# Patient Record
Sex: MALE | Race: ASIAN | NOT HISPANIC OR LATINO | Employment: UNEMPLOYED | URBAN - METROPOLITAN AREA
[De-identification: names, ages, dates, MRNs, and addresses within clinical notes are randomized per-mention and may not be internally consistent; named-entity substitution may affect disease eponyms.]

---

## 2024-01-31 ENCOUNTER — TELEPHONE (OUTPATIENT)
Dept: FAMILY MEDICINE | Facility: CLINIC | Age: 29
End: 2024-01-31

## 2024-02-01 ENCOUNTER — OFFICE VISIT (OUTPATIENT)
Dept: FAMILY MEDICINE | Facility: CLINIC | Age: 29
End: 2024-02-01
Payer: COMMERCIAL

## 2024-02-01 ENCOUNTER — PATIENT MESSAGE (OUTPATIENT)
Dept: FAMILY MEDICINE | Facility: CLINIC | Age: 29
End: 2024-02-01

## 2024-02-01 VITALS
OXYGEN SATURATION: 99 % | HEART RATE: 80 BPM | SYSTOLIC BLOOD PRESSURE: 142 MMHG | WEIGHT: 123 LBS | TEMPERATURE: 98 F | DIASTOLIC BLOOD PRESSURE: 98 MMHG

## 2024-02-01 DIAGNOSIS — F41.0 PANIC DISORDER: ICD-10-CM

## 2024-02-01 DIAGNOSIS — F90.2 ATTENTION DEFICIT HYPERACTIVITY DISORDER (ADHD), COMBINED TYPE: Primary | ICD-10-CM

## 2024-02-01 DIAGNOSIS — K58.2 IRRITABLE BOWEL SYNDROME WITH BOTH CONSTIPATION AND DIARRHEA: ICD-10-CM

## 2024-02-01 DIAGNOSIS — F90.2 ATTENTION DEFICIT HYPERACTIVITY DISORDER (ADHD), COMBINED TYPE: ICD-10-CM

## 2024-02-01 DIAGNOSIS — Z00.00 ANNUAL PHYSICAL EXAM: ICD-10-CM

## 2024-02-01 PROBLEM — K90.9 DIARRHEA DUE TO MALABSORPTION: Status: ACTIVE | Noted: 2024-02-01

## 2024-02-01 PROBLEM — R19.7 DIARRHEA DUE TO MALABSORPTION: Status: ACTIVE | Noted: 2024-02-01

## 2024-02-01 PROCEDURE — 99385 PREV VISIT NEW AGE 18-39: CPT | Mod: S$GLB,,, | Performed by: INTERNAL MEDICINE

## 2024-02-01 PROCEDURE — 3075F SYST BP GE 130 - 139MM HG: CPT | Mod: CPTII,S$GLB,, | Performed by: INTERNAL MEDICINE

## 2024-02-01 PROCEDURE — 99999 PR PBB SHADOW E&M-EST. PATIENT-LVL IV: CPT | Mod: PBBFAC,,, | Performed by: INTERNAL MEDICINE

## 2024-02-01 PROCEDURE — 1160F RVW MEDS BY RX/DR IN RCRD: CPT | Mod: CPTII,S$GLB,, | Performed by: INTERNAL MEDICINE

## 2024-02-01 PROCEDURE — 1159F MED LIST DOCD IN RCRD: CPT | Mod: CPTII,S$GLB,, | Performed by: INTERNAL MEDICINE

## 2024-02-01 PROCEDURE — 3080F DIAST BP >= 90 MM HG: CPT | Mod: CPTII,S$GLB,, | Performed by: INTERNAL MEDICINE

## 2024-02-01 RX ORDER — PROPRANOLOL HYDROCHLORIDE 10 MG/1
10 TABLET ORAL 3 TIMES DAILY
COMMUNITY
End: 2024-05-01

## 2024-02-01 RX ORDER — DEXTROAMPHETAMINE SACCHARATE, AMPHETAMINE ASPARTATE MONOHYDRATE, DEXTROAMPHETAMINE SULFATE AND AMPHETAMINE SULFATE 3.75; 3.75; 3.75; 3.75 MG/1; MG/1; MG/1; MG/1
15 CAPSULE, EXTENDED RELEASE ORAL EVERY MORNING
COMMUNITY
End: 2024-02-01 | Stop reason: SDUPTHER

## 2024-02-01 RX ORDER — DEXTROAMPHETAMINE SACCHARATE, AMPHETAMINE ASPARTATE MONOHYDRATE, DEXTROAMPHETAMINE SULFATE AND AMPHETAMINE SULFATE 3.75; 3.75; 3.75; 3.75 MG/1; MG/1; MG/1; MG/1
15 CAPSULE, EXTENDED RELEASE ORAL EVERY MORNING
Qty: 90 CAPSULE | Refills: 0 | Status: CANCELLED | OUTPATIENT
Start: 2024-02-01

## 2024-02-01 RX ORDER — DEXTROAMPHETAMINE SACCHARATE, AMPHETAMINE ASPARTATE MONOHYDRATE, DEXTROAMPHETAMINE SULFATE AND AMPHETAMINE SULFATE 3.75; 3.75; 3.75; 3.75 MG/1; MG/1; MG/1; MG/1
15 CAPSULE, EXTENDED RELEASE ORAL EVERY MORNING
Qty: 90 CAPSULE | Refills: 0 | Status: SHIPPED | OUTPATIENT
Start: 2024-02-01 | End: 2024-02-02 | Stop reason: SDUPTHER

## 2024-02-01 NOTE — PROGRESS NOTES
Chief Complaint: Medication Refill      Joseph Torrez  is a 28 y.o. year old patient who presents to clinic today to establish as a new patient.     Last year diagnosed with ADHD in [place]. Started on Adderrall.   Juan Carlos Wolf (psychiatrist that diagnosis) [email or web address].    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 Was jaundiced. Work up was normal.     Social hx: Patient does not smoke cigarettes or vape. Patient does not drink alcoholic beverages. 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     Past Surgical History:   Procedure Laterality Date    COLONOSCOPY          Family History   Problem Relation Age of Onset    Cancer Mother     Stroke Paternal Grandfather     Learning disabilities Brother         Social History     Socioeconomic History    Marital status: Single   Tobacco Use    Smoking status: Never     Passive exposure: Never    Smokeless tobacco: Never   Substance and Sexual Activity    Alcohol use: Not Currently    Sexual activity: Not Currently     Partners: Female     Birth control/protection: Condom     Social Determinants of Health     Financial Resource Strain: High Risk (1/31/2024)    Overall Financial Resource Strain (CARDIA)     Difficulty of Paying Living Expenses: Hard   Food Insecurity: Patient Declined (1/31/2024)    Hunger Vital Sign     Worried About Running Out of Food in the Last Year: Patient declined     Ran Out of Food in the Last Year: Patient declined   Transportation Needs: No Transportation Needs (1/31/2024)    PRAPARE - Transportation     Lack of Transportation (Medical): No     Lack of Transportation (Non-Medical): No   Physical Activity: Insufficiently Active (1/31/2024)    Exercise Vital Sign     Days of Exercise per Week: 3 days     Minutes of Exercise per Session: 40 min   Stress: No Stress Concern Present (1/31/2024)    [place] [employer] - Occupational Stress Questionnaire     Feeling of Stress : Only a little   Social  Connections: Unknown (1/31/2024)    Social Connection and Isolation Panel [NHANES]     Frequency of Communication with Friends and Family: More than three times a week     Frequency of Social Gatherings with Friends and Family: Once a week     Active Member of Clubs or Organizations: Yes     Attends Club or Organization Meetings: Patient declined     Marital Status: Never    Housing Stability: Patient Declined (1/31/2024)    Housing Stability Vital Sign     Unable to Pay for Housing in the Last Year: Patient declined     Unstable Housing in the Last Year: Patient declined         Current Outpatient Medications:     propranoloL (INDERAL) 10 MG tablet, Take 10 mg by mouth 3 (three) times daily., Disp: , Rfl:     dextroamphetamine-amphetamine (ADDERALL XR) 15 MG 24 hr capsule, Take 1 capsule (15 mg total) by mouth every morning., Disp: 90 capsule, Rfl: 0     Review of Systems   Constitutional:  Negative for malaise/fatigue.   HENT:  Negative for congestion and sore throat.    Eyes:  Negative for blurred vision.   Respiratory:  Negative for cough and shortness of breath.    Cardiovascular:  Negative for chest pain and leg swelling.   Gastrointestinal:  Positive for abdominal pain, constipation and diarrhea. Negative for nausea.   Genitourinary:  Negative for dysuria.   Musculoskeletal:  Negative for joint pain.   Neurological:  Negative for headaches.   Psychiatric/Behavioral:  Negative for depression. The patient is not nervous/anxious.         Objective:      Vitals:    02/01/24 0803   BP: (!) 142/98   Pulse:    Temp:        Physical Exam  Vitals and nursing note reviewed.   Constitutional:       Appearance: Normal appearance.   HENT:      Head: Normocephalic and atraumatic.   Cardiovascular:      Rate and Rhythm: Normal rate and regular rhythm.   Pulmonary:      Effort: Pulmonary effort is normal.      Breath sounds: Normal breath sounds. No wheezing or rales.   Abdominal:      General: Bowel sounds are  normal.      Palpations: Abdomen is soft.      Tenderness: There is no abdominal tenderness.   Musculoskeletal:      Right lower leg: No edema.      Left lower leg: No edema.   Skin:     General: Skin is warm and dry.   Neurological:      General: No focal deficit present.      Mental Status: He is alert and oriented to person, place, and time.   Psychiatric:         Mood and Affect: Mood normal.         Behavior: Behavior normal.          Assessment:       1. Attention deficit hyperactivity disorder (ADHD), combined type    2. Annual physical exam    3. Irritable bowel syndrome with both constipation and diarrhea    4. Panic disorder          Plan:   1. Attention deficit hyperactivity disorder (ADHD), combined type  Assessment & Plan:  Last year diagnosed with ADHD in [place] by Dr. Juan Carlos Wolf. Started on Adderall with tapering up dose     - continue Adderall  15mg daily (3 month supply given)   - 3 month follow up     Orders:  -     dextroamphetamine-amphetamine (ADDERALL XR) 15 MG 24 hr capsule; Take 1 capsule (15 mg total) by mouth every morning.  Dispense: 90 capsule; Refill: 0    2. Annual physical exam  Assessment & Plan:  - annual labs   - will discuss vaccines at next visit   - Reviewed medical, surgical, family and social history.       Orders:  -     CBC Auto Differential; Future; Expected date: 02/01/2024  -     Hemoglobin A1C; Future; Expected date: 02/01/2024  -     Lipid Panel; Future; Expected date: 02/01/2024  -     Basic Metabolic Panel; Future; Expected date: 02/01/2024  -     HEPATIC FUNCTION PANEL; Future; Expected date: 02/01/2024  -     HIV 1/2 Ag/Ab (4th Gen); Future; Expected date: 02/01/2024  -     Hepatitis C Antibody; Future; Expected date: 02/01/2024    3. Irritable bowel syndrome with both constipation and diarrhea  Assessment & Plan:  Diarrhea: Stool described has yellow and stringy sometimes  Had extensive work up done in [place], including fecal fat which were normal    Reports incontinence sometimes, wilma when he's stressed   Also reports periods of constipation. Says that he has abd pain but it is less severe than the start     - advised patient to continue Immodium PRN for now  - may consider GI referral for IBS treatment if still unresolved   - LFT and lipid panel, if bilirubin high, might consider cholestyramine       4. Panic disorder  Assessment & Plan:  Chronic, controlled. 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

## 2024-02-01 NOTE — ASSESSMENT & PLAN NOTE
- annual labs   - will discuss vaccines at next visit   - Reviewed medical, surgical, family and social history.

## 2024-02-01 NOTE — ASSESSMENT & PLAN NOTE
Last year diagnosed with ADHD in [place] by Dr. Juan Carlos Wolf. Started on Adderall with tapering up dose     - continue Adderall  15mg daily (3 month supply given)   - 3 month follow up

## 2024-02-01 NOTE — ASSESSMENT & PLAN NOTE
Chronic, controlled. 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

## 2024-02-01 NOTE — ASSESSMENT & PLAN NOTE
Diarrhea: Stool described has yellow and stringy sometimes  Had extensive work up done in [place], including fecal fat which were normal   Reports incontinence sometimes, wilma when he's stressed   Also reports periods of constipation. Says that he has abd pain but it is less severe than the start     - advised patient to continue Immodium PRN for now  - may consider GI referral for IBS treatment if still unresolved   - LFT and lipid panel, if bilirubin high, might consider cholestyramine

## 2024-02-01 NOTE — PROGRESS NOTES
Health Maintenance Due   Topic     Hepatitis C Screening  Consult pcp    Lipid Panel  Consult pcp    HPV Vaccines (1 - Male 2-dose series)     HIV Screening  Consult pcp    TETANUS VACCINE      COVID-19 Vaccine (4 - 2023-24 season) Not offered at this office

## 2024-02-02 ENCOUNTER — PATIENT MESSAGE (OUTPATIENT)
Dept: FAMILY MEDICINE | Facility: CLINIC | Age: 29
End: 2024-02-02
Payer: MEDICAID

## 2024-02-02 DIAGNOSIS — F90.2 ATTENTION DEFICIT HYPERACTIVITY DISORDER (ADHD), COMBINED TYPE: ICD-10-CM

## 2024-02-05 RX ORDER — DEXTROAMPHETAMINE SACCHARATE, AMPHETAMINE ASPARTATE MONOHYDRATE, DEXTROAMPHETAMINE SULFATE AND AMPHETAMINE SULFATE 3.75; 3.75; 3.75; 3.75 MG/1; MG/1; MG/1; MG/1
15 CAPSULE, EXTENDED RELEASE ORAL EVERY MORNING
Qty: 90 CAPSULE | Refills: 0 | Status: SHIPPED | OUTPATIENT
Start: 2024-02-05 | End: 2024-05-01 | Stop reason: SDUPTHER

## 2024-02-07 ENCOUNTER — PATIENT MESSAGE (OUTPATIENT)
Dept: FAMILY MEDICINE | Facility: CLINIC | Age: 29
End: 2024-02-07
Payer: MEDICAID

## 2024-02-07 DIAGNOSIS — F90.2 ATTENTION DEFICIT HYPERACTIVITY DISORDER (ADHD), COMBINED TYPE: ICD-10-CM

## 2024-02-09 ENCOUNTER — PATIENT MESSAGE (OUTPATIENT)
Dept: FAMILY MEDICINE | Facility: CLINIC | Age: 29
End: 2024-02-09
Payer: MEDICAID

## 2024-02-09 RX ORDER — DEXTROAMPHETAMINE SACCHARATE, AMPHETAMINE ASPARTATE MONOHYDRATE, DEXTROAMPHETAMINE SULFATE AND AMPHETAMINE SULFATE 3.75; 3.75; 3.75; 3.75 MG/1; MG/1; MG/1; MG/1
15 CAPSULE, EXTENDED RELEASE ORAL EVERY MORNING
Qty: 90 CAPSULE | Refills: 0 | OUTPATIENT
Start: 2024-02-09

## 2024-02-15 ENCOUNTER — LAB VISIT (OUTPATIENT)
Dept: LAB | Facility: HOSPITAL | Age: 29
End: 2024-02-15
Attending: INTERNAL MEDICINE
Payer: MEDICAID

## 2024-02-15 DIAGNOSIS — Z00.00 ANNUAL PHYSICAL EXAM: ICD-10-CM

## 2024-02-15 LAB
ALBUMIN SERPL BCP-MCNC: 4.5 G/DL (ref 3.5–5.2)
ALP SERPL-CCNC: 60 U/L (ref 55–135)
ALT SERPL W/O P-5'-P-CCNC: 15 U/L (ref 10–44)
ANION GAP SERPL CALC-SCNC: 8 MMOL/L (ref 8–16)
AST SERPL-CCNC: 18 U/L (ref 10–40)
BASOPHILS # BLD AUTO: 0.03 K/UL (ref 0–0.2)
BASOPHILS NFR BLD: 0.6 % (ref 0–1.9)
BILIRUB DIRECT SERPL-MCNC: 0.4 MG/DL (ref 0.1–0.3)
BILIRUB SERPL-MCNC: 2.6 MG/DL (ref 0.1–1)
BUN SERPL-MCNC: 13 MG/DL (ref 6–20)
CALCIUM SERPL-MCNC: 9.9 MG/DL (ref 8.7–10.5)
CHLORIDE SERPL-SCNC: 107 MMOL/L (ref 95–110)
CHOLEST SERPL-MCNC: 125 MG/DL (ref 120–199)
CHOLEST/HDLC SERPL: 3.3 {RATIO} (ref 2–5)
CO2 SERPL-SCNC: 25 MMOL/L (ref 23–29)
CREAT SERPL-MCNC: 1.1 MG/DL (ref 0.5–1.4)
DIFFERENTIAL METHOD BLD: NORMAL
EOSINOPHIL # BLD AUTO: 0.1 K/UL (ref 0–0.5)
EOSINOPHIL NFR BLD: 1.4 % (ref 0–8)
ERYTHROCYTE [DISTWIDTH] IN BLOOD BY AUTOMATED COUNT: 12.7 % (ref 11.5–14.5)
EST. GFR  (NO RACE VARIABLE): >60 ML/MIN/1.73 M^2
ESTIMATED AVG GLUCOSE: 85 MG/DL (ref 68–131)
GLUCOSE SERPL-MCNC: 90 MG/DL (ref 70–110)
HBA1C MFR BLD: 4.6 % (ref 4–5.6)
HCT VFR BLD AUTO: 47.9 % (ref 40–54)
HCV AB SERPL QL IA: NORMAL
HDLC SERPL-MCNC: 38 MG/DL (ref 40–75)
HDLC SERPL: 30.4 % (ref 20–50)
HGB BLD-MCNC: 16.3 G/DL (ref 14–18)
HIV 1+2 AB+HIV1 P24 AG SERPL QL IA: NORMAL
IMM GRANULOCYTES # BLD AUTO: 0.01 K/UL (ref 0–0.04)
IMM GRANULOCYTES NFR BLD AUTO: 0.2 % (ref 0–0.5)
LDLC SERPL CALC-MCNC: 75.2 MG/DL (ref 63–159)
LYMPHOCYTES # BLD AUTO: 2.1 K/UL (ref 1–4.8)
LYMPHOCYTES NFR BLD: 40.7 % (ref 18–48)
MCH RBC QN AUTO: 29.3 PG (ref 27–31)
MCHC RBC AUTO-ENTMCNC: 34 G/DL (ref 32–36)
MCV RBC AUTO: 86 FL (ref 82–98)
MONOCYTES # BLD AUTO: 0.3 K/UL (ref 0.3–1)
MONOCYTES NFR BLD: 6 % (ref 4–15)
NEUTROPHILS # BLD AUTO: 2.6 K/UL (ref 1.8–7.7)
NEUTROPHILS NFR BLD: 51.1 % (ref 38–73)
NONHDLC SERPL-MCNC: 87 MG/DL
NRBC BLD-RTO: 0 /100 WBC
PLATELET # BLD AUTO: 234 K/UL (ref 150–450)
PMV BLD AUTO: 11.2 FL (ref 9.2–12.9)
POTASSIUM SERPL-SCNC: 4.8 MMOL/L (ref 3.5–5.1)
PROT SERPL-MCNC: 7.4 G/DL (ref 6–8.4)
RBC # BLD AUTO: 5.56 M/UL (ref 4.6–6.2)
SODIUM SERPL-SCNC: 140 MMOL/L (ref 136–145)
TRIGL SERPL-MCNC: 59 MG/DL (ref 30–150)
WBC # BLD AUTO: 5.14 K/UL (ref 3.9–12.7)

## 2024-02-15 PROCEDURE — 80048 BASIC METABOLIC PNL TOTAL CA: CPT | Performed by: INTERNAL MEDICINE

## 2024-02-15 PROCEDURE — 86803 HEPATITIS C AB TEST: CPT | Performed by: INTERNAL MEDICINE

## 2024-02-15 PROCEDURE — 80076 HEPATIC FUNCTION PANEL: CPT | Performed by: INTERNAL MEDICINE

## 2024-02-15 PROCEDURE — 85025 COMPLETE CBC W/AUTO DIFF WBC: CPT | Performed by: INTERNAL MEDICINE

## 2024-02-15 PROCEDURE — 36415 COLL VENOUS BLD VENIPUNCTURE: CPT | Performed by: INTERNAL MEDICINE

## 2024-02-15 PROCEDURE — 87389 HIV-1 AG W/HIV-1&-2 AB AG IA: CPT | Performed by: INTERNAL MEDICINE

## 2024-02-15 PROCEDURE — 80061 LIPID PANEL: CPT | Performed by: INTERNAL MEDICINE

## 2024-02-15 PROCEDURE — 83036 HEMOGLOBIN GLYCOSYLATED A1C: CPT | Performed by: INTERNAL MEDICINE

## 2024-02-22 ENCOUNTER — TELEPHONE (OUTPATIENT)
Dept: FAMILY MEDICINE | Facility: CLINIC | Age: 29
End: 2024-02-22

## 2024-02-22 ENCOUNTER — OFFICE VISIT (OUTPATIENT)
Dept: FAMILY MEDICINE | Facility: CLINIC | Age: 29
End: 2024-02-22
Payer: COMMERCIAL

## 2024-02-22 ENCOUNTER — LAB VISIT (OUTPATIENT)
Dept: LAB | Facility: HOSPITAL | Age: 29
End: 2024-02-22
Attending: INTERNAL MEDICINE
Payer: MEDICAID

## 2024-02-22 VITALS
TEMPERATURE: 98 F | HEART RATE: 94 BPM | HEIGHT: 69 IN | OXYGEN SATURATION: 98 % | DIASTOLIC BLOOD PRESSURE: 90 MMHG | RESPIRATION RATE: 18 BRPM | BODY MASS INDEX: 17.47 KG/M2 | WEIGHT: 117.94 LBS | SYSTOLIC BLOOD PRESSURE: 142 MMHG

## 2024-02-22 DIAGNOSIS — R63.4 WEIGHT LOSS: ICD-10-CM

## 2024-02-22 DIAGNOSIS — R10.13 EPIGASTRIC PAIN: ICD-10-CM

## 2024-02-22 DIAGNOSIS — Z87.2 HISTORY OF PSORIASIS: ICD-10-CM

## 2024-02-22 DIAGNOSIS — K58.2 IRRITABLE BOWEL SYNDROME WITH BOTH CONSTIPATION AND DIARRHEA: ICD-10-CM

## 2024-02-22 DIAGNOSIS — F90.2 ATTENTION DEFICIT HYPERACTIVITY DISORDER (ADHD), COMBINED TYPE: ICD-10-CM

## 2024-02-22 DIAGNOSIS — R10.13 EPIGASTRIC PAIN: Primary | ICD-10-CM

## 2024-02-22 PROBLEM — Z00.00 ANNUAL PHYSICAL EXAM: Status: RESOLVED | Noted: 2024-02-01 | Resolved: 2024-02-22

## 2024-02-22 PROCEDURE — 3077F SYST BP >= 140 MM HG: CPT | Mod: CPTII,S$GLB,, | Performed by: INTERNAL MEDICINE

## 2024-02-22 PROCEDURE — 3080F DIAST BP >= 90 MM HG: CPT | Mod: CPTII,S$GLB,, | Performed by: INTERNAL MEDICINE

## 2024-02-22 PROCEDURE — 3008F BODY MASS INDEX DOCD: CPT | Mod: CPTII,S$GLB,, | Performed by: INTERNAL MEDICINE

## 2024-02-22 PROCEDURE — 99999 PR PBB SHADOW E&M-EST. PATIENT-LVL V: CPT | Mod: PBBFAC,,, | Performed by: INTERNAL MEDICINE

## 2024-02-22 PROCEDURE — 99214 OFFICE O/P EST MOD 30 MIN: CPT | Mod: S$GLB,,, | Performed by: INTERNAL MEDICINE

## 2024-02-22 PROCEDURE — 86677 HELICOBACTER PYLORI ANTIBODY: CPT | Performed by: INTERNAL MEDICINE

## 2024-02-22 PROCEDURE — 1160F RVW MEDS BY RX/DR IN RCRD: CPT | Mod: CPTII,S$GLB,, | Performed by: INTERNAL MEDICINE

## 2024-02-22 PROCEDURE — 36415 COLL VENOUS BLD VENIPUNCTURE: CPT | Performed by: INTERNAL MEDICINE

## 2024-02-22 PROCEDURE — 3044F HG A1C LEVEL LT 7.0%: CPT | Mod: CPTII,S$GLB,, | Performed by: INTERNAL MEDICINE

## 2024-02-22 PROCEDURE — 1159F MED LIST DOCD IN RCRD: CPT | Mod: CPTII,S$GLB,, | Performed by: INTERNAL MEDICINE

## 2024-02-22 RX ORDER — HYDROCORTISONE 25 MG/G
CREAM TOPICAL 2 TIMES DAILY
Qty: 28 G | Refills: 0 | Status: SHIPPED | OUTPATIENT
Start: 2024-02-22

## 2024-02-22 RX ORDER — BUSPIRONE HYDROCHLORIDE 5 MG/1
TABLET ORAL
COMMUNITY
Start: 2023-07-06 | End: 2024-05-01

## 2024-02-22 RX ORDER — DEXTROAMPHETAMINE SACCHARATE, AMPHETAMINE ASPARTATE MONOHYDRATE, DEXTROAMPHETAMINE SULFATE AND AMPHETAMINE SULFATE 2.5; 2.5; 2.5; 2.5 MG/1; MG/1; MG/1; MG/1
CAPSULE, EXTENDED RELEASE ORAL
COMMUNITY
Start: 2023-10-17 | End: 2024-05-01 | Stop reason: DRUGHIGH

## 2024-02-22 NOTE — PROGRESS NOTES
Chief Complaint: Referral (Derm and GI)      Joseph Torrez  is a 28 y.o. year old patient who presents today for the following issues     Pt reports that he has been having epigastric pain, usually worsened with food. Over the past month. Did stray away from usual IBS diet to gain weight, but pain has not resolved despite adjusting diet. Still has IBS sx of constipation and diarrhea. Denies black stools.     Also has history of psoriasis diagnosed when 8 yo. Reports that he might be developing early signs of recurring rash.     Patient also has been unintentionally losing weight. Has lost about 6lbs from his last visit. Does report not eating dinner sometimes cause it makes him nauseous. Usually eats dinner around 6:30pm.     Is doing well on current dose of Adderall    Past Surgical History:   Procedure Laterality Date    COLONOSCOPY          Family History   Problem Relation Age of Onset    Cancer Mother     Stroke Paternal Grandfather     Learning disabilities Brother         Social History     Socioeconomic History    Marital status: Single   Tobacco Use    Smoking status: Never     Passive exposure: Never    Smokeless tobacco: Never   Substance and Sexual Activity    Alcohol use: Not Currently    Sexual activity: Not Currently     Partners: Female     Birth control/protection: Condom     Social Determinants of Health     Financial Resource Strain: High Risk (1/31/2024)    Overall Financial Resource Strain (CARDIA)     Difficulty of Paying Living Expenses: Hard   Food Insecurity: Patient Declined (1/31/2024)    Hunger Vital Sign     Worried About Running Out of Food in the Last Year: Patient declined     Ran Out of Food in the Last Year: Patient declined   Transportation Needs: No Transportation Needs (1/31/2024)    PRAPARE - Transportation     Lack of Transportation (Medical): No     Lack of Transportation (Non-Medical): No   Physical Activity: Insufficiently Active (1/31/2024)    Exercise Vital Sign     Days of  Exercise per Week: 3 days     Minutes of Exercise per Session: 40 min   Stress: No Stress Concern Present (1/31/2024)    [employer] [employer]ational [employer] - Occupational Stress Questionnaire     Feeling of Stress : Only a little   Social Connections: Unknown (1/31/2024)    Social Connection and Isolation Panel [NHANES]     Frequency of Communication with Friends and Family: More than three times a week     Frequency of Social Gatherings with Friends and Family: Once a week     Active Member of Clubs or Organizations: Yes     Attends Club or Organization Meetings: Patient declined     Marital Status: Never    Housing Stability: Patient Declined (1/31/2024)    Housing Stability Vital Sign     Unable to Pay for Housing in the Last Year: Patient declined     Unstable Housing in the Last Year: Patient declined         Current Outpatient Medications:     dextroamphetamine-amphetamine (ADDERALL XR) 15 MG 24 hr capsule, Take 1 capsule (15 mg total) by mouth every morning., Disp: 90 capsule, Rfl: 0    propranoloL (INDERAL) 10 MG tablet, Take 10 mg by mouth 3 (three) times daily., Disp: , Rfl:     busPIRone (BUSPAR) 5 MG Tab, TAKE 1 TABLET TWICE A DAY BY ORAL ROUTE AS NEEDED., Disp: , Rfl:     dextroamphetamine-amphetamine (ADDERALL XR) 10 MG 24 hr capsule, TAKE ONE CAPSULE (10 MG) BY MOUTH EVERY MORNING WITH BREAKFAST AS NEEDED, Disp: , Rfl:     hydrocortisone 2.5 % cream, Apply topically 2 (two) times daily., Disp: 28 g, Rfl: 0     Review of Systems   Constitutional:  Positive for weight loss.   Gastrointestinal:  Positive for abdominal pain, constipation, diarrhea and heartburn. Negative for blood in stool, melena and nausea.   Skin:  Positive for rash. Negative for itching.        Objective:      Vitals:    02/22/24 0725   BP: (!) 142/90   Pulse:    Resp:    Temp:        Physical Exam  Constitutional:       Appearance: Normal appearance.   HENT:      Head: Normocephalic and atraumatic.   Cardiovascular:       Rate and Rhythm: Normal rate.   Musculoskeletal:         General: Normal range of motion.   Skin:     General: Skin is warm and dry.      Findings: Rash (right upper arm, 2x2cm, flat, slightly scaly) present.   Neurological:      General: No focal deficit present.      Mental Status: He is alert and oriented to person, place, and time.          Assessment:       1. Epigastric pain    2. Irritable bowel syndrome with both constipation and diarrhea    3. Weight loss    4. History of psoriasis    5. Attention deficit hyperactivity disorder (ADHD), combined type          Plan:   1. Epigastric pain  Assessment & Plan:  Acute, mild, intermittent, unresolved. Refer to HPI   Has been taking esmoprazole     - H.pylori testing   - continue PPI for now     Orders:  -     H. PYLORI ANTIBODY, IGG; Future; Expected date: 02/22/2024    2. Irritable bowel syndrome with both constipation and diarrhea  Assessment & Plan:  Patient had extensive work up for IBS, but has not had EGD.     - patient plans to find psychotherapist for IBS   - symptomatic mgmt with dietary changes for now     Orders:  -     Ambulatory referral/consult to Nutrition Services; Future; Expected date: 02/29/2024  -     Ambulatory referral/consult to Gastroenterology; Future; Expected date: 02/29/2024    3. Weight loss  Assessment & Plan:  Repots unintentional weight loss, refer to [facility]     - referral to dietician    Orders:  -     Ambulatory referral/consult to Nutrition Services; Future; Expected date: 02/29/2024    4. History of psoriasis  Assessment & Plan:  Also has history of psoriasis diagnosed when 10 yo. Reports that he might be developing early signs of recurring rash.     - derm referral   - hydrocort PRN     Orders:  -     Ambulatory referral/consult to Dermatology; Future; Expected date: 02/29/2024  -     hydrocortisone 2.5 % cream; Apply topically 2 (two) times daily.  Dispense: 28 g; Refill: 0    5. Attention deficit hyperactivity disorder (ADHD),  combined type  Assessment & Plan:  Chronic, controlled     - continue adderall          Other orders  -     Cancel: (In Office Administered) HPV Vaccine (9-Valent) (3 Dose) (IM)         No follow-ups on file.

## 2024-02-22 NOTE — ASSESSMENT & PLAN NOTE
Also has history of psoriasis diagnosed when 10 yo. Reports that he might be developing early signs of recurring rash.     - derm referral   - hydrocort PRN

## 2024-02-22 NOTE — ASSESSMENT & PLAN NOTE
Acute, mild, intermittent, unresolved. Refer to HPI   Has been taking esmoprazole     - H.pylori testing   - continue PPI for now

## 2024-02-22 NOTE — ASSESSMENT & PLAN NOTE
Patient had extensive work up for IBS, but has not had EGD.     - patient plans to find psychotherapist for IBS   - symptomatic mgmt with dietary changes for now

## 2024-02-22 NOTE — TELEPHONE ENCOUNTER
Olive/Shaista Pre-Authorization needs to know what provider Dr. Maya is referring patient to for Nutrition Referral.  Please advise.

## 2024-02-22 NOTE — PROGRESS NOTES
Health Maintenance Due   Topic     HPV Vaccines (1 - Male 2-dose series)     COVID-19 Vaccine (4 - 2023-24 season) Not offered at this Facility

## 2024-02-22 NOTE — TELEPHONE ENCOUNTER
----- Message from Bertha Watson sent at 2/22/2024  3:42 PM CST -----  .Type: Patient Call Back    Who called: Olive Noonan Pre Authorization     What is the request in detail: Calling about a Nutrition Referral. Need to know what provider you're referring him to    Can the clinic reply by [facility]? No     Would the patient rather a call back or a response via [facility]? Call Back     Best call back number:[phone]    Additional Information:

## 2024-02-23 LAB — H PYLORI IGG SERPL QL IA: NEGATIVE

## 2024-02-27 NOTE — TELEPHONE ENCOUNTER
Per Olive/Shaista Pre-Authorization the referral was able to be approved with having the name of the provider patient was being referred to.  Please be advised.

## 2024-04-30 ENCOUNTER — TELEPHONE (OUTPATIENT)
Dept: FAMILY MEDICINE | Facility: CLINIC | Age: 29
End: 2024-04-30
Payer: MEDICAID

## 2024-05-01 ENCOUNTER — OFFICE VISIT (OUTPATIENT)
Dept: FAMILY MEDICINE | Facility: CLINIC | Age: 29
End: 2024-05-01
Payer: COMMERCIAL

## 2024-05-01 VITALS
TEMPERATURE: 98 F | WEIGHT: 115.75 LBS | DIASTOLIC BLOOD PRESSURE: 72 MMHG | HEART RATE: 83 BPM | RESPIRATION RATE: 18 BRPM | SYSTOLIC BLOOD PRESSURE: 130 MMHG | BODY MASS INDEX: 17.14 KG/M2 | HEIGHT: 69 IN | OXYGEN SATURATION: 99 %

## 2024-05-01 DIAGNOSIS — K58.2 IRRITABLE BOWEL SYNDROME WITH BOTH CONSTIPATION AND DIARRHEA: ICD-10-CM

## 2024-05-01 DIAGNOSIS — F90.2 ATTENTION DEFICIT HYPERACTIVITY DISORDER (ADHD), COMBINED TYPE: Primary | ICD-10-CM

## 2024-05-01 DIAGNOSIS — Z23 NEED FOR HPV VACCINATION: ICD-10-CM

## 2024-05-01 DIAGNOSIS — R63.4 WEIGHT LOSS: ICD-10-CM

## 2024-05-01 DIAGNOSIS — Z87.2 HISTORY OF PSORIASIS: ICD-10-CM

## 2024-05-01 PROBLEM — F41.0 PANIC DISORDER: Status: RESOLVED | Noted: 2024-02-01 | Resolved: 2024-05-01

## 2024-05-01 PROCEDURE — 90471 IMMUNIZATION ADMIN: CPT | Mod: S$GLB,,, | Performed by: INTERNAL MEDICINE

## 2024-05-01 PROCEDURE — 3008F BODY MASS INDEX DOCD: CPT | Mod: CPTII,S$GLB,, | Performed by: INTERNAL MEDICINE

## 2024-05-01 PROCEDURE — 99999 PR PBB SHADOW E&M-EST. PATIENT-LVL III: CPT | Mod: PBBFAC,,, | Performed by: INTERNAL MEDICINE

## 2024-05-01 PROCEDURE — 1159F MED LIST DOCD IN RCRD: CPT | Mod: CPTII,S$GLB,, | Performed by: INTERNAL MEDICINE

## 2024-05-01 PROCEDURE — 90651 9VHPV VACCINE 2/3 DOSE IM: CPT | Mod: S$GLB,,, | Performed by: INTERNAL MEDICINE

## 2024-05-01 PROCEDURE — 3078F DIAST BP <80 MM HG: CPT | Mod: CPTII,S$GLB,, | Performed by: INTERNAL MEDICINE

## 2024-05-01 PROCEDURE — 3075F SYST BP GE 130 - 139MM HG: CPT | Mod: CPTII,S$GLB,, | Performed by: INTERNAL MEDICINE

## 2024-05-01 PROCEDURE — 1160F RVW MEDS BY RX/DR IN RCRD: CPT | Mod: CPTII,S$GLB,, | Performed by: INTERNAL MEDICINE

## 2024-05-01 PROCEDURE — 99214 OFFICE O/P EST MOD 30 MIN: CPT | Mod: 25,S$GLB,, | Performed by: INTERNAL MEDICINE

## 2024-05-01 PROCEDURE — 3044F HG A1C LEVEL LT 7.0%: CPT | Mod: CPTII,S$GLB,, | Performed by: INTERNAL MEDICINE

## 2024-05-01 RX ORDER — DEXTROAMPHETAMINE SACCHARATE, AMPHETAMINE ASPARTATE MONOHYDRATE, DEXTROAMPHETAMINE SULFATE AND AMPHETAMINE SULFATE 3.75; 3.75; 3.75; 3.75 MG/1; MG/1; MG/1; MG/1
15 CAPSULE, EXTENDED RELEASE ORAL EVERY MORNING
Qty: 90 CAPSULE | Refills: 0 | Status: SHIPPED | OUTPATIENT
Start: 2024-05-01

## 2024-05-01 NOTE — ASSESSMENT & PLAN NOTE
Repots unintentional weight loss, refer to HPI   123 (in Feb) -> 115lbs  Also has been trying to have smaller and more frequent meals. Reports his appetite is slowly picking up.    - encouraged patient to continue current dietary changes and to build a more positive relationship with food  - f/up 3 months, if still losing weight, will consider starting cyproheptadine

## 2024-05-01 NOTE — ASSESSMENT & PLAN NOTE
Did not get complete vaccination.   Will receive 1st dose today. Counseled pt on need for 2nd dose in 2 months and third dose in 6 months. Patient said he will arrange at the main campus.

## 2024-05-01 NOTE — PROGRESS NOTES
Chief Complaint: Follow-up      Joseph Torrez  is a 28 y.o. year old patient who presents today for follow up     Changed scrubs, reports rash is better. Also has been trying to have smaller and more frequent meals. Reports his appetite is slowly picking up. Did not start buspar or propanolol. Doing well on Adderall, issues with appetite started before starting Adderall.     Past Medical History:   Diagnosis Date    Panic disorder 02/01/2024       Past Surgical History:   Procedure Laterality Date    COLONOSCOPY          Family History   Problem Relation Name Age of Onset    Cancer Mother Gaby Clay     Stroke Paternal Grandfather Abi Rivas     Learning disabilities Brother Serge Samayoa         Social History     Socioeconomic History    Marital status: Single   Tobacco Use    Smoking status: Never     Passive exposure: Never    Smokeless tobacco: Never   Substance and Sexual Activity    Alcohol use: Not Currently    Sexual activity: Not Currently     Partners: Female     Birth control/protection: Condom     Social Determinants of Health     Financial Resource Strain: High Risk (1/31/2024)    Overall Financial Resource Strain (CARDIA)     Difficulty of Paying Living Expenses: Hard   Food Insecurity: Patient Declined (1/31/2024)    Hunger Vital Sign     Worried About Running Out of Food in the Last Year: Patient declined     Ran Out of Food in the Last Year: Patient declined   Transportation Needs: No Transportation Needs (1/31/2024)    PRAPARE - Transportation     Lack of Transportation (Medical): No     Lack of Transportation (Non-Medical): No   Physical Activity: Insufficiently Active (1/31/2024)    Exercise Vital Sign     Days of Exercise per Week: 3 days     Minutes of Exercise per Session: 40 min   Stress: No Stress Concern Present (1/31/2024)    [employer] of [employer]ational [employer] - Occupational Stress Questionnaire     Feeling of Stress : Only a little   Housing Stability: Patient Declined  (1/31/2024)    Housing Stability Vital Sign     Unable to Pay for Housing in the Last Year: Patient declined     Unstable Housing in the Last Year: Patient declined         Current Outpatient Medications:     hydrocortisone 2.5 % cream, Apply topically 2 (two) times daily., Disp: 28 g, Rfl: 0    dextroamphetamine-amphetamine (ADDERALL XR) 15 MG 24 hr capsule, Take 1 capsule (15 mg total) by mouth every morning., Disp: 90 capsule, Rfl: 0  No current facility-administered medications for this visit.     Review of Systems   Constitutional:  Positive for weight loss.   Gastrointestinal:  Negative for abdominal pain, constipation and diarrhea.   Psychiatric/Behavioral:  The patient is not nervous/anxious.         Objective:      Vitals:    05/01/24 0712   BP: 130/72   Pulse: 83   Resp: 18   Temp: 97.7 °F (36.5 °C)       Physical Exam  Constitutional:       Comments: Thin body habitus   HENT:      Head: Normocephalic and atraumatic.   Cardiovascular:      Rate and Rhythm: Normal rate.   Musculoskeletal:         General: Normal range of motion.   Skin:     General: Skin is warm and dry.   Neurological:      General: No focal deficit present.      Mental Status: He is alert and oriented to person, place, and time.          Assessment:       1. Attention deficit hyperactivity disorder (ADHD), combined type    2. History of psoriasis    3. Weight loss    4. Irritable bowel syndrome with both constipation and diarrhea    5. Need for HPV vaccination          Plan:   1. Attention deficit hyperactivity disorder (ADHD), combined type  Assessment & Plan:  Chronic, controlled     - continue adderall 15mg daily (3 month supply)      Orders:  -     dextroamphetamine-amphetamine (ADDERALL XR) 15 MG 24 hr capsule; Take 1 capsule (15 mg total) by mouth every morning.  Dispense: 90 capsule; Refill: 0    2. History of psoriasis  Assessment & Plan:  Also has history of psoriasis diagnosed when 8 yo. Changed scrubs, reports rash is  better.    - has derm appt in June  - hydrocort PRN       3. Weight loss  Assessment & Plan:  Repots unintentional weight loss, refer to HPI   123 (in Feb) -> 115lbs  Also has been trying to have smaller and more frequent meals. Reports his appetite is slowly picking up.    - encouraged patient to continue current dietary changes and to build a more positive relationship with food  - f/up 3 months, if still losing weight, will consider starting cyproheptadine       4. Irritable bowel syndrome with both constipation and diarrhea  Assessment & Plan:  Patient had extensive work up for IBS, but has not had EGD.   Symptoms slightly improved with changes in meals     - has appt with GI in June   - symptomatic mgmt with dietary changes for now       5. Need for HPV vaccination  Assessment & Plan:  Did not get complete vaccination.   Will receive 1st dose today. Counseled pt on need for 2nd dose in 2 months and third dose in 6 months. Patient said he will arrange at the main campus.     Orders:  -     VFC-hpv vaccine,9-daniel (GARDASIL 9) vaccine 0.5 mL         Follow up in about 3 months (around 8/1/2024) for med refill.

## 2024-05-01 NOTE — ASSESSMENT & PLAN NOTE
Also has history of psoriasis diagnosed when 10 yo. Changed scrubs, reports rash is better.    - has derm appt in June  - hydrocort PRN

## 2024-05-01 NOTE — PROGRESS NOTES
Health Maintenance Due   Topic     HPV Vaccines (1 - Male 3-dose series) Pt agree to get today    COVID-19 Vaccine (4 - 2023-24 season) Not offered at this Facility

## 2024-05-01 NOTE — ASSESSMENT & PLAN NOTE
Patient had extensive work up for IBS, but has not had EGD.   Symptoms slightly improved with changes in meals     - has appt with GI in June   - symptomatic mgmt with dietary changes for now

## 2024-06-03 ENCOUNTER — OFFICE VISIT (OUTPATIENT)
Dept: DERMATOLOGY | Facility: CLINIC | Age: 29
End: 2024-06-03
Payer: COMMERCIAL

## 2024-06-03 DIAGNOSIS — L30.9 DERMATITIS, UNSPECIFIED: ICD-10-CM

## 2024-06-03 PROCEDURE — 1160F RVW MEDS BY RX/DR IN RCRD: CPT | Mod: CPTII,S$GLB,, | Performed by: STUDENT IN AN ORGANIZED HEALTH CARE EDUCATION/TRAINING PROGRAM

## 2024-06-03 PROCEDURE — 88342 IMHCHEM/IMCYTCHM 1ST ANTB: CPT | Performed by: PATHOLOGY

## 2024-06-03 PROCEDURE — 88305 TISSUE EXAM BY PATHOLOGIST: CPT | Performed by: PATHOLOGY

## 2024-06-03 PROCEDURE — 88312 SPECIAL STAINS GROUP 1: CPT | Performed by: PATHOLOGY

## 2024-06-03 PROCEDURE — 99202 OFFICE O/P NEW SF 15 MIN: CPT | Mod: 25,S$GLB,, | Performed by: STUDENT IN AN ORGANIZED HEALTH CARE EDUCATION/TRAINING PROGRAM

## 2024-06-03 PROCEDURE — 88341 IMHCHEM/IMCYTCHM EA ADD ANTB: CPT | Mod: 59 | Performed by: PATHOLOGY

## 2024-06-03 PROCEDURE — 99999 PR PBB SHADOW E&M-EST. PATIENT-LVL III: CPT | Mod: PBBFAC,,, | Performed by: STUDENT IN AN ORGANIZED HEALTH CARE EDUCATION/TRAINING PROGRAM

## 2024-06-03 PROCEDURE — 1159F MED LIST DOCD IN RCRD: CPT | Mod: CPTII,S$GLB,, | Performed by: STUDENT IN AN ORGANIZED HEALTH CARE EDUCATION/TRAINING PROGRAM

## 2024-06-03 PROCEDURE — 3044F HG A1C LEVEL LT 7.0%: CPT | Mod: CPTII,S$GLB,, | Performed by: STUDENT IN AN ORGANIZED HEALTH CARE EDUCATION/TRAINING PROGRAM

## 2024-06-03 NOTE — PROGRESS NOTES
Subjective:      Patient ID:  Joseph Torrez is a 28 y.o. male who presents for   Chief Complaint   Patient presents with    Psoriasis     28 y.o. 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 Psoriasis/rash  Pt states he was diagnosed with psoriasis in middle school (ears, extensor arms and legs) and has not had a flare up in years. Recently broke out in a scaly rash on shoulders / arms / legs 3 months ago. Was unsure if this was a reaction to something or a psoriasis flare up. He applied steroid cream from PCP and flare up resolved.   No joint pains or stiffness  Denies preceding URI or strep     2. Light discoloration  He has white spots/discoloration on trunk, arms, legs since about age 13 as well and in areas different than prior psoriasis  Not itchy  Start scaly sometimes  Getting new ones still  Pt was born in [place]           Review of Systems   Constitutional:  Negative for fever.   HENT:  Positive for sore throat.    Gastrointestinal:  Negative for diarrhea.   Musculoskeletal:  Negative for arthralgias.   Skin:  Positive for itching and rash.       Objective:   Physical Exam   Skin:   Areas Examined (abnormalities noted in diagram):   RUE Inspected  LUE Inspection Performed  RLE Inspected  LLE Inspection Performed       Diagram Legend     Erythematous scaling macule/papule c/w actinic keratosis       Vascular papule c/w angioma      Pigmented verrucoid papule/plaque c/w seborrheic keratosis      Yellow umbilicated papule c/w sebaceous hyperplasia      Irregularly shaped tan macule c/w lentigo     1-2 mm smooth white papules consistent with Milia      Movable subcutaneous cyst with punctum c/w epidermal inclusion cyst      Subcutaneous movable cyst c/w pilar cyst      Firm pink to brown papule c/w dermatofibroma      Pedunculated fleshy papule(s) c/w skin tag(s)      Evenly pigmented macule c/w junctional nevus     Mildly variegated pigmented, slightly  irregular-bordered macule c/w mildly atypical nevus      Flesh colored to evenly pigmented papule c/w intradermal nevus       Pink pearly papule/plaque c/w basal cell carcinoma      Erythematous hyperkeratotic cursted plaque c/w SCC      Surgical scar with no sign of skin cancer recurrence      Open and closed comedones      Inflammatory papules and pustules      Verrucoid papule consistent consistent with wart     Erythematous eczematous patches and plaques     Dystrophic onycholytic nail with subungual debris c/w onychomycosis     Umbilicated papule    Erythematous-base heme-crusted tan verrucoid plaque consistent with inflamed seborrheic keratosis     Erythematous Silvery Scaling Plaque c/w Psoriasis     See annotation                  Assessment / Plan:      Pathology Orders:       Normal Orders This Visit    Specimen to Pathology, Dermatology     Comments:    Number of Specimens:->1  ------------------------->-------------------------  Spec 1 Procedure:->Biopsy  Spec 1 Clinical Impression:->hypopigmented 2 mm round macules  non-scaly diffusely over arms, trunk, legs- pitryiasis  licenoides v idiopathic guttate hypomelanosis v less likely  hypopigmented MF  Spec 1 Source:->left upper arm  Release to patient->Immediate    Questions:    Procedure Type: Dermatology and skin neoplasms    Number of Specimens: 1    ------------------------: -------------------------    Spec 1 Procedure: Biopsy    Spec 1 Clinical Impression: hypopigmented 2 mm round macules non-scaly diffusely over arms, trunk, legs- pitryiasis licenoides v idiopathic guttate hypomelanosis v less likely hypopigmented MF    Spec 1 Source: left upper arm    Release to patient: Immediate    Release to patient:           Dermatitis, unspecified  Status: chronic condition flaring and/or not at treatment goal (> 1 yr)  Punch biopsy procedure note:  Punch biopsy performed after verbal consent obtained. Area marked and prepped with alcohol. Approximately 1cc  of 1% lidocaine with epinephrine injected. 3 mm disposable punch used to remove lesion. Hemostasis obtained and biopsy site closed with 1 - 2 Prolene sutures. Wound care instructions reviewed with patient and handout given.    Pt initially made appt/presented for hx of psoriasis/rash which has completely resolved on exam today. Recommend he take pictures or present  to clinic if this flares again. Incidentally was found to have hypopigmented macules diffusely on body- he states he is bothered by this, has had this condition approximately since age 13 and still spreading, and would like to pursue diagnosis and treatment so performed biopsy today to help with management    RTC 2 weeks suture removal

## 2024-06-03 NOTE — Clinical Note
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 I was kind of stumped and want to make sure I'm not missing something obvious. Took a biopsy today he says he still gets new ones present since age 13 maybe??

## 2024-06-06 ENCOUNTER — OFFICE VISIT (OUTPATIENT)
Dept: GASTROENTEROLOGY | Facility: CLINIC | Age: 29
End: 2024-06-06
Payer: MEDICAID

## 2024-06-06 ENCOUNTER — PATIENT MESSAGE (OUTPATIENT)
Dept: GASTROENTEROLOGY | Facility: CLINIC | Age: 29
End: 2024-06-06

## 2024-06-06 VITALS
HEIGHT: 68 IN | DIASTOLIC BLOOD PRESSURE: 104 MMHG | BODY MASS INDEX: 16.94 KG/M2 | SYSTOLIC BLOOD PRESSURE: 147 MMHG | WEIGHT: 111.75 LBS | HEART RATE: 101 BPM

## 2024-06-06 DIAGNOSIS — R10.13 EPIGASTRIC PAIN: Primary | ICD-10-CM

## 2024-06-06 DIAGNOSIS — R76.8 POSITIVE ANA (ANTINUCLEAR ANTIBODY): ICD-10-CM

## 2024-06-06 DIAGNOSIS — R10.11 RUQ PAIN: ICD-10-CM

## 2024-06-06 DIAGNOSIS — R13.19 ESOPHAGEAL DYSPHAGIA: ICD-10-CM

## 2024-06-06 DIAGNOSIS — R63.4 WEIGHT LOSS: ICD-10-CM

## 2024-06-06 DIAGNOSIS — K21.9 GASTROESOPHAGEAL REFLUX DISEASE WITHOUT ESOPHAGITIS: ICD-10-CM

## 2024-06-06 PROCEDURE — 99204 OFFICE O/P NEW MOD 45 MIN: CPT | Mod: S$PBB,,,

## 2024-06-06 PROCEDURE — 3077F SYST BP >= 140 MM HG: CPT | Mod: CPTII,,,

## 2024-06-06 PROCEDURE — 99213 OFFICE O/P EST LOW 20 MIN: CPT | Mod: PBBFAC

## 2024-06-06 PROCEDURE — 3008F BODY MASS INDEX DOCD: CPT | Mod: CPTII,,,

## 2024-06-06 PROCEDURE — 3080F DIAST BP >= 90 MM HG: CPT | Mod: CPTII,,,

## 2024-06-06 PROCEDURE — 1160F RVW MEDS BY RX/DR IN RCRD: CPT | Mod: CPTII,,,

## 2024-06-06 PROCEDURE — 1159F MED LIST DOCD IN RCRD: CPT | Mod: CPTII,,,

## 2024-06-06 PROCEDURE — 3044F HG A1C LEVEL LT 7.0%: CPT | Mod: CPTII,,,

## 2024-06-06 PROCEDURE — 99999 PR PBB SHADOW E&M-EST. PATIENT-LVL III: CPT | Mod: PBBFAC,,,

## 2024-06-06 RX ORDER — AMOXICILLIN 500 MG
1 CAPSULE ORAL DAILY
COMMUNITY

## 2024-06-06 RX ORDER — FAMOTIDINE 40 MG/1
40 TABLET, FILM COATED ORAL DAILY
Qty: 30 TABLET | Refills: 11 | Status: SHIPPED | OUTPATIENT
Start: 2024-06-06 | End: 2025-06-06

## 2024-06-06 RX ORDER — DOCUSATE SODIUM 100 MG/1
100 CAPSULE, LIQUID FILLED ORAL DAILY
COMMUNITY

## 2024-06-06 RX ORDER — FERROUS SULFATE 325(65) MG
325 TABLET, DELAYED RELEASE (ENTERIC COATED) ORAL DAILY
COMMUNITY

## 2024-06-06 NOTE — PATIENT INSTRUCTIONS
For GERD/Reflux:     Take Pepcid 20mg daily to help with symptoms, as needed.     Remain upright for at least 3 hours after eating.      Elevate the head of the bed for nighttime.      Avoid foods that you have noticed make your symptoms worse (possible triggers include: peppermint, alcohol, chocolate, caffeine, spicy foods, greasy/fried foods, acidic foods-citrus).

## 2024-06-06 NOTE — PROGRESS NOTES
Gastroenterology Clinic Consultation Note    Reason for Visit:  The primary encounter diagnosis was Epigastric pain. Diagnoses of RUQ pain, Weight loss, Esophageal dysphagia, Gastroesophageal reflux disease without esophagitis, Body mass index (BMI) less than 19 in adult, and Positive CANDIDA (antinuclear antibody) were also pertinent to this visit.    PCP:   Barbara Maya   [address] / [facility] [address]      Initial HPI   This is a 28 y.o. male presenting for complaints of abdominal pain, specifically epigastric and RUQ, gastroesophageal reflux, weight loss concerns and esophageal dysphagia.     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 He reports 2 years ago (2022) he was in [place] for school when he developed excruciating epigastric in which he went to the ED at that time he reports he was jaundice and lab work revealed elevated LFTs and doctors were concerned for pancreatitis. He underwent a MRCP and US at that time which showed a dilated bile duct, but was negative for gallstones. Pt had repeat labs in 11/2022 which were back to normal. No further testing or symptoms proceeded from his recollection. (Unable to obtain records from [place] healthcare visits.)     A year later he moved to [place] and was treated by GI from April to October 2023 for concerns of Crohns vs IBS-D. He underwent a colonoscopy which revealed one small polyp. He does not recall the timeframe in which GI advised repeat colonoscopy. He was also suffering with GERD at that time and completed an 8 week trial of Protonix which was helpful and he was able to DC PPI use. No EGD was advised at that time. He did have blood work, autoimmune testing, and stool testing at that time. His CANDIDA came back positive for CANDIDA 1:40 nuclear speckled, negative for celiac, but bilirubin levels  were elevated. At that time he was told he had Gilbert's Syndrome that could possibly be causing his abdominal discomfort and elevated bilirubin and Gilbert's Syndrome required no treatment. No official diagnosis was ever given for Crohns nor IBS-D. He was started on Adderall for ADHD at that time and noticed he began losing weight after initiation in October 2023 (BMI 18.1 then). (Limited medical records available from [facility]. No diagnostic imaging or lab work available either).     Currently, he has complaints of epigastric/RUQ pain for the past 1.5 months. He reports he is having yellowish colored stools, [place] stool scale 4-5. He reports fatty foods will make the stools looser, BSC 6. He follows a vegetarian diet and supplements his protein with dairy, chickpeas, beans and nuts as well as protein bars and shakes. He takes stool softners, vitamins B, D & K2, omega 3, and iron pills daily. He does not smoke cigarettes, occasionally drinks alcohol, and does not notice any effects of caffeine on abdominal pain. He has begun to notice esophageal dysphagia in the past 1.5 months when eating more solid foods such as a veggie burger or eats foods too quickly. He does not have any troubles with clear or full liquids. He has also noticed that the GERD symptoms he had in the past have begun again. He reports he did not want to start a H2 blocker or PPI before seeing GI. He notices that when he eats more frequent smaller meals the symptoms are not as bad and he recently began probiotics which have helped as well.     He denies fever, chills, nausea, vomiting, regurgitation, changes in bowel habits, melena, blood in stool, and changes in stool caliber. He is mostly concerned with continued weight loss, BMI now 17.0 down from 18.1 in October 2023. Discussed holding off on PPI or H2 until EGD/Colonscopy completed.    ROS:  Review of Systems   Constitutional:  Positive for malaise/fatigue and weight loss.  "Negative for chills and fever.   HENT: Negative.     Eyes: Negative.    Respiratory:  Negative for cough and shortness of breath.    Cardiovascular: Negative.  Negative for chest pain and palpitations.   Gastrointestinal:  Positive for abdominal pain, diarrhea (at times not daily) and heartburn. Negative for blood in stool, constipation, melena, nausea and vomiting.   Genitourinary: Negative.  Negative for flank pain.   Musculoskeletal: Negative.    Skin:  Positive for rash (seeing derm for new rash, h/o psoriasis).   Neurological: Negative.    Endo/Heme/Allergies: Negative.    Psychiatric/Behavioral:  The patient is nervous/anxious.    All other systems reviewed and are negative.       Medical History:  has a past medical history of Panic disorder (02/01/2024).    Surgical History:  has a past surgical history that includes Colonoscopy.    Family History: family history includes Cancer in his mother; Learning disabilities in his brother; Stroke in his paternal grandfather..       Review of patient's allergies indicates:  No Known Allergies    Current Outpatient Medications on File Prior to Visit   Medication Sig Dispense Refill    docusate sodium (COLACE) 100 MG capsule Take 100 mg by mouth once daily.      cholecalciferol, vitD3,/vit K2 (VITAMIN D3-VITAMIN K2 ORAL) Take 1 capsule by mouth once daily.      dextroamphetamine-amphetamine (ADDERALL XR) 15 MG 24 hr capsule Take 1 capsule (15 mg total) by mouth every morning. 90 capsule 0    ferrous sulfate 325 (65 FE) MG EC tablet Take 325 mg by mouth once daily.      hydrocortisone 2.5 % cream Apply topically 2 (two) times daily. 28 g 0    omega-3 fatty acids/fish oil (FISH OIL-OMEGA-3 FATTY ACIDS) 300-1,000 mg capsule Take 1 capsule by mouth once daily.       No current facility-administered medications on file prior to visit.         Objective Findings:    Vital Signs:  BP (!) 147/104   Pulse 101   Ht 5' 8" (1.727 m)   Wt 50.7 kg (111 lb 12.4 oz)   BMI 17.00 " kg/m²   Body mass index is 17 kg/m².    Physical Exam:  Physical Exam  Vitals and nursing note reviewed.   Constitutional:       General: He is not in acute distress.     Appearance: Normal appearance. He is underweight. He is not ill-appearing.   HENT:      Head: Normocephalic and atraumatic.      Right Ear: External ear normal.      Left Ear: External ear normal.      Nose: Nose normal.   Eyes:      General: No scleral icterus.     Extraocular Movements: Extraocular movements intact.   Cardiovascular:      Rate and Rhythm: Tachycardia present.   Pulmonary:      Effort: Pulmonary effort is normal. No respiratory distress.   Abdominal:      General: Abdomen is flat. There is no distension.      Tenderness: There is no guarding.   Musculoskeletal:         General: No swelling. Normal range of motion.      Cervical back: Normal range of motion.   Skin:     General: Skin is warm and dry.      Coloration: Skin is not jaundiced.      Findings: Rash present.   Neurological:      Mental Status: He is alert and oriented to person, place, and time.   Psychiatric:         Speech: Speech is rapid and pressured.         Thought Content: Thought content normal.       Labs:  Lab Results   Component Value Date    WBC 5.14 02/15/2024    HGB 16.3 02/15/2024    HCT 47.9 02/15/2024     02/15/2024    CHOL 125 02/15/2024    TRIG 59 02/15/2024    HDL 38 (L) 02/15/2024    ALKPHOS 60 02/15/2024    ALT 15 02/15/2024    AST 18 02/15/2024     02/15/2024    K 4.8 02/15/2024     02/15/2024    CREATININE 1.1 02/15/2024    BUN 13 02/15/2024    CO2 25 02/15/2024    HGBA1C 4.6 02/15/2024       Imaging reviewed: None available      Endoscopy reviewed: Not available      Assessment:  1. Epigastric pain    2. RUQ pain    3. Weight loss    4. Esophageal dysphagia    5. Gastroesophageal reflux disease without esophagitis    6. Body mass index (BMI) less than 19 in adult    7. Positive CANDIDA (antinuclear antibody)      Orders Placed This  Encounter    Stool culture    US Abdomen Limited (GALLBLADDER)    CBC W/ AUTO DIFFERENTIAL    COMPREHENSIVE METABOLIC PANEL    H. pylori antigen, stool    Pancreatic elastase, fecal    Fecal fat, qualitative    WBC, Stool    Rotavirus antigen, stool    Adenovirus Molecular Detection, PCR, Non-Blood Stool    Calprotectin, Stool    Giardia / Cryptosporidum, EIA    Stool Exam-Ova,Cysts,Parasites    CNADIDA Screen w/Reflex    Lipase    famotidine (PEPCID) 40 MG tablet         Plan:  CBC/CMP to check blood counts, electrolytes, LFTs, renal function  Due to positive CANDIDA results in past with unavailable results will recheck CANDIDA panel to r/o any autoimmune causes for current complaints  Due to abdominal pain/intermittent diarrhea will check stool studies  Will order Lipase d/t past diagnosis of pancreatitis and current epigastric pain   Due to epigastric/RUQ pain will check US liver and gallbladder  Due to weight loss will order EGD/ Colonoscopy  Due to dysphagia will order EGD, may need additional manometry if indicated s/p EGD  Will order Pepcid to be started s/p EGD PRN for reflux s/s   Recommend continuing to eat small frequent meals to avoid over-consumption triggering GERD s/s and abdominal pain       Thank you for allowing me to participate in this patient's care.    Sincerely,     NAVNEET PINZON-URIEL  Gastroenterology Department  [facility] Office [phone]

## 2024-06-06 NOTE — PROGRESS NOTES
"GENERAL GI PATIENT INTAKE:    COVID symptoms in the last 7 days (runny nose, sore throat, congestion, cough, fever): No  PCP: Barbara Maya  If not PCP-  number given to establish [phone]: N/A    ALLERGIES REVIEWED:  N/A    CHIEF COMPLAINT:    Chief Complaint   Patient presents with    Abdominal Pain       VITAL SIGNS:  BP (!) 147/104   Pulse 101   Ht 5' 8" (1.727 m)   Wt 50.7 kg (111 lb 12.4 oz)   BMI 17.00 kg/m²      Change in medical, surgical, family or social history: No      REVIEWED MEDICATION LIST RECONCILED INCLUDING ABOVE MEDS:  Yes     "

## 2024-06-07 ENCOUNTER — PATIENT MESSAGE (OUTPATIENT)
Dept: GASTROENTEROLOGY | Facility: CLINIC | Age: 29
End: 2024-06-07
Payer: MEDICAID

## 2024-06-07 ENCOUNTER — HOSPITAL ENCOUNTER (OUTPATIENT)
Dept: RADIOLOGY | Facility: HOSPITAL | Age: 29
Discharge: HOME | End: 2024-06-07
Payer: COMMERCIAL

## 2024-06-07 DIAGNOSIS — R10.13 EPIGASTRIC PAIN: ICD-10-CM

## 2024-06-07 DIAGNOSIS — R10.11 RUQ PAIN: ICD-10-CM

## 2024-06-07 DIAGNOSIS — K21.9 GASTROESOPHAGEAL REFLUX DISEASE WITHOUT ESOPHAGITIS: ICD-10-CM

## 2024-06-07 DIAGNOSIS — R13.19 ESOPHAGEAL DYSPHAGIA: ICD-10-CM

## 2024-06-07 DIAGNOSIS — R17 ELEVATED BILIRUBIN: Primary | ICD-10-CM

## 2024-06-07 DIAGNOSIS — R63.4 WEIGHT LOSS: ICD-10-CM

## 2024-06-07 PROCEDURE — 76705 ECHO EXAM OF ABDOMEN: CPT | Mod: TC

## 2024-06-07 PROCEDURE — 76705 ECHO EXAM OF ABDOMEN: CPT | Mod: 26,,, | Performed by: RADIOLOGY

## 2024-06-07 NOTE — TELEPHONE ENCOUNTER
Referral sent for elevated bilirubin. Pt seen in clinic yesterday with epigastric/RUQ pain.       component Ref Range & Units 07:54  (6/7/24) 3 mo ago  (2/15/24) 3 mo ago  (2/15/24)   Sodium 136 - 145 mmol/L 144  140   Potassium 3.5 - 5.1 mmol/L 4.9  4.8   Chloride 95 - 110 mmol/L 107  107   CO2 23 - 29 mmol/L 28  25   Glucose 70 - 110 mg/dL 85  90   BUN 6 - 20 mg/dL 16  13   Creatinine 0.5 - 1.4 mg/dL 1.0  1.1   Calcium 8.7 - 10.5 mg/dL 9.8  9.9   Total Protein 6.0 - 8.4 g/dL 7.6 7.4    Albumin 3.5 - 5.2 g/dL 4.4 4.5    Total Bilirubin 0.1 - 1.0 mg/dL 2.7 High  2.6 High  CM    Comment: For infants and newborns, interpretation of results should be based  on gestational age, weight and in agreement with clinical  observations.    Premature Infant recommended reference ranges:  Up to 24 hours.............<8.0 mg/dL  Up to 48 hours............<12.0 mg/dL  3-5 days..................<15.0 mg/dL  6-29 days.................<15.0 mg/dL   Alkaline Phosphatase 55 - 135 U/L 65 60    AST 10 - 40 U/L 22 18    ALT 10 - 44 U/L 20 15    eGFR >60 mL/min/1.73 m^2 >60.0  >60.0   Anion Gap 8 - 16 mmol/L 9  8   Resulting Agency  [facility]     
Clear

## 2024-06-13 ENCOUNTER — PATIENT MESSAGE (OUTPATIENT)
Dept: GASTROENTEROLOGY | Facility: CLINIC | Age: 29
End: 2024-06-13
Payer: MEDICAID

## 2024-06-13 ENCOUNTER — TELEPHONE (OUTPATIENT)
Dept: ENDOSCOPY | Facility: HOSPITAL | Age: 29
End: 2024-06-13
Payer: MEDICAID

## 2024-06-13 NOTE — TELEPHONE ENCOUNTER
" Patient calling to schedule a urgent EGD/Colonoscopy. Informed patient that I will have to call him back to schedule once a spot becomes available. Patient understood.      ----- Message -----  From: Nicole Gage FNP-C  Sent: 6/6/2024  11:57 AM CDT  To: [facility]  Subject: EGD/Colonoscopy                                  Procedure: EGD/Colonoscopy    Diagnosis: Abdominal pain, GERD, Dysphagia, and Weight loss    Procedure Timing: Within 4 weeks (Urgent)    *If within 4 weeks selected, please aislinn as high priority*    *If greater than 12 weeks, please select "5-12 weeks" and delay sending until 3 months prior to requested date*    Location: Any Site    Additional Scheduling Information: No scheduling concerns    Prep Specifications:Standard prep    Is the patient taking a GLP-1 Agonist:no    Have you attached a patient to this message: yes  "

## 2024-06-14 ENCOUNTER — TELEPHONE (OUTPATIENT)
Dept: ENDOSCOPY | Facility: HOSPITAL | Age: 29
End: 2024-06-14
Payer: MEDICAID

## 2024-06-14 DIAGNOSIS — R10.9 ABDOMINAL PAIN, UNSPECIFIED ABDOMINAL LOCATION: Primary | ICD-10-CM

## 2024-06-14 DIAGNOSIS — R63.4 WEIGHT LOSS: ICD-10-CM

## 2024-06-14 DIAGNOSIS — R13.10 DYSPHAGIA, UNSPECIFIED TYPE: ICD-10-CM

## 2024-06-14 DIAGNOSIS — K21.9 GASTROESOPHAGEAL REFLUX DISEASE, UNSPECIFIED WHETHER ESOPHAGITIS PRESENT: ICD-10-CM

## 2024-06-14 DIAGNOSIS — Z12.11 SCREEN FOR COLON CANCER: ICD-10-CM

## 2024-06-14 RX ORDER — POLYETHYLENE GLYCOL 3350, SODIUM SULFATE ANHYDROUS, SODIUM BICARBONATE, SODIUM CHLORIDE, POTASSIUM CHLORIDE 236; 22.74; 6.74; 5.86; 2.97 G/4L; G/4L; G/4L; G/4L; G/4L
4 POWDER, FOR SOLUTION ORAL ONCE
Qty: 1 EACH | Refills: 0 | Status: SHIPPED | OUTPATIENT
Start: 2024-06-14 | End: 2024-06-14

## 2024-06-14 NOTE — TELEPHONE ENCOUNTER
Spoke to pt to schedule procedure(s) Colonoscopy/EGD       Physician to perform procedure(s) Dr. NOREEN Luo  Date of Procedure (s) 6/20/2024  Arrival Time 1:55 PM  Time of Procedure(s) 2:55 PM   Location of Procedure(s) [address]  Type of Rx Prep sent to patient: PEG  Instructions provided to patient via [facility]    Patient was informed on the following information and verbalized understanding. Screening questionnaire reviewed with patient and complete. If procedure requires anesthesia, a responsible adult needs to be present to accompany the patient home, patient cannot drive after receiving anesthesia. Appointment details are tentative, especially check-in time. Patient will receive a prep-op call 7 days prior to confirm check-in time for procedure. If applicable the patient should contact their pharmacy to verify Rx for procedure prep is ready for pick-up. Patient was advised to call the scheduling department at [phone] if pharmacy states no Rx is available. Patient was advised to call the endoscopy scheduling department if any questions or concerns arise.       Endoscopy Scheduling Department         Colonoscopy Procedure Prep Instructions    Date of procedure: 6/20/2024 Arrive at: 1:55 PM    Location of Department:   [facility] [address] [place] [address]  Take the [facility] Elevators to 4th Floor Endoscopy Lab    As soon as possible:   your prep from pharmacy and over the counter DULCOLAX LAXATIVE TABLETS            On the day before your procedure   What You CAN do:   You may have clear liquids ONLY-see below for list.     Liquids That Are OK to Drink:   Water  Sports drinks (Gatorade, Power-Aid)  Coffee or tea (no cream or nondairy creamer)  Clear juices without pulp (apple, white grape)  Gelatin desserts (no fruit or toppings)  Clear soda (sprite, coke, ginger ale)  Chicken broth (until 12 midnight the night before procedure)    What You CANNOT do:   Do  not EAT solid food, drink milk or anything   colored red.  Do not drink alcohol.  Do not take oral medications within 1 hour of starting   each dose of prep.  No gum chewing or candy morning of procedure                       Note:   (Please disregard the insert instructions from pharmacy).  PEG Bowel Prep is indicated for cleansing of the colon as a preparation for colonoscopy in adults.   Be sure to tell your doctor about all the medicines you take, including prescription and non-prescription medicines, vitamins, and herbal supplements. PEG Bowel Prep may affect how other medicines work.  Medication taken by mouth may not be absorbed properly when taken within 1 hour before the start of each dose of PEG Bowel Prep.    It is not uncommon to experience some abdominal cramping, nausea and/or vomiting when taking the prep. If you have nausea and/or vomiting while taking the prep, stop drinking for 20 to 30 minutes then continue.      How to take prep:    PEG Bowel Prep is a (2-day) prep.    One (1) bottle of prep are required for a complete preparation for colonoscopy. Dilute the solution concentrate as directed prior to use. You must drink water with each dose of prep, and additional water after each dose.    DOSE 1--Day Before Colonoscopy 6/19/2024     Drink at least 6 to 8 glasses of clear liquids from time you wake up until you begin your prep and then continue until bedtime to avoid dehydration.     12:00 pm (NOON) Mix your entire container of prep with lukewarm water and refrigerate. Take four (4) Dulcolax (Bisacodyl) tablets with at least 8 ounces or more of clear liquids.       6:00 pm:    You must complete Steps 1 and 2 below before going to bed:    Step 1-Drink half the liquid in the container within one (1) hour.   Step 2-Refrigerate the remaining half of the liquid for dose 2. See below when to begin this step.                       IMPORTANT: If you experience preparation-related symptoms (for example,  nausea, bloating, or cramping), stop, or slow the rate of drinking the additional water until your symptoms decrease.    DOSE 2--Day of the Colonoscopy 6/20/2024 at 2-3 AM.    For this dose, repeat Step 1 shown above using the remaining half of the liquid prep.   You may continue drinking water/clear liquids until   4 hours before your colonoscopy or as directed by the scheduling nurse  10:55 AM.      For information about your procedure, two (2) things to view prior to colonoscopy:  Please watch this informational video. It is important to watch this animated consent video prior to your arrival. If you haven't watched the video prior to arriving, you are required to watch it during admission which can causes delays.    Options for viewing:   Using a keyboard:  press and hold the control tab (Ctrl) and left mouse click to follow links.           Colonoscopy Instructional Video                                                                                   OR    Type link address into your web browser's address bar:  [email or web address]/[email or web address]      Educational Booklet with pictures:      Colonoscopy Prep - Liquid      Comments:          IMPORTANT INFORMATION TO KNOW BEFORE YOUR PROCEDURE    [facility] [place] 4th Floor    If your procedure requires the administration of anesthesia, it is necessary for a responsible adult to drive you home. (Medical Transportation, Uber, Lyft, Taxi, etc. may ONLY be used if a responsible adult is present to accompany you home.  The responsible adult CAN'T be the  of the service).      person must be available to return to pick you up within 15 minutes of being notified of discharge.       Please bring a picture ID, insurance card, & copayment      Take Medications as directed below:      If you begin taking any blood thinning medications or injectable weight loss/diabetes medications (other than insulin) , please contact the endoscopy  scheduling department listed below as soon as possible.    If you are diabetic see the attached instruction sheet regarding your medication.     If you take HEART, BLOOD PRESSURE, SEIZURE, PAIN, LUNG (including inhalers/nebulizers), ANTI-REJECTION (transplant patients), or PSYCHIATRIC medications, please take at your regular times with a sip of water or as directed by the scheduling nurse.     Important contact information:    Endoscopy [phone] Hours of operation Monday-Friday 8:00-4:30pm.    Questions about insurance or financial obligations call [phone] or [phone].    If you have questions regarding the prep or need to reschedule, please call [phone]. After hours questions requiring immediate assistance, contact [facility] nurse line at [phone] or [phone].   NOTE:     On occasion, unforeseen circumstances may cause a delay in your procedure start time. We respect your time and appreciate your patience during these circumstances.      Comments:          Are you ready for your Colonoscopy?      __ If you take blood thinners,weight loss or diabetic injectable medications, have you stopped taking them according to your doctor's instructions before your procedures?  __ Have you stopped eating solid foods and followed a clear liquid diet a full day before your procedure or followed the diet       guidelines indicated in your instructions?       REMINDER: NO BROTH AFTER MIDNIGHT THE DAY BEFORE PROCEDURE.   __ Have you completed all your prep solution? PLEASE DO NOT FOLLOW the insert/or box instructions from pharmacy)  __ Have you taken your blood pressure, heart, seizure, or other essential medications the morning of your procedure?  __ Have you planned for a ride to and from procedure?  (Medical Transportation, Uber, Lyft, Taxi, etc. may ONLY be used if a responsible adult is present to accompany you home). The responsible adult CANNOT be the  of the service.   person must be available to return and pick you up within 15 minutes of being notified of discharge.           Questions or Concerns?  Please call us!  [phone] (M-F) [phone] (Nights and weekends)    Listed below are some helpful tips:    Please bring protective cases for eyewear and hearing aids-wear comfortable clothing/shoes.  Follow prep instructions closely so you don't have to do it twice.  Bowel prep is prescription used to clean out the colon before a colonoscopy. The prep increases movement of your colon by causing you to have diarrhea (loose stools). Cleaning out stool from the colon helps your doctor to see in your colon clearly during this procedure.   It is important to stay hydrated before, during and after bowel prep to prevent loss of fluid (dehydration). You can have water and your choice of clear liquids. Reminder: these liquids you will drink in addition to the bowel prep.     Preparing the mixture:    First, mix the prep with water.  Make the taste better by adding a sugar free drink mix (Crystal Light) can improve the taste of your prep.   Use a large bore (opening) straw. Place towards the back of mouth (throat) as tolerated.  Prepare a prep mixture that is lightly chilled, but not ice-cold. Drinking a large amount of ice-cold liquid can make you feel very ill.  Avoid drinking any RED beverages or eating popsicles with this color for the 24 hours leading up to your procedure. This color can look like blood in the colon.    Consuming the prep:    Take your time. If you feel ill, take a 15-minute break from drinking the prep mixture  Combat hunger and dehydration with clear liquids. Options like JELL-O, or popsicles will help.  Settle in with good reading material. The goal is to clean out 6 feet of colon, so you can plan on spending a good deal of time in the bathroom. Have some good reading material on-hand or an iPad ready to keep yourself entertained!  Keep yourself  comfortable. We recommend applying personal hygiene wipes, Tucks pads and a soothing ointment, like A&D ointment, Desitin, or Vaseline to your bottom before starting and as needed to protect your skin.

## 2024-06-14 NOTE — TELEPHONE ENCOUNTER
"----- Message from Aileen Jones MA sent at 6/13/2024  1:35 PM CDT -----  Regarding: FW: EGD/Colonoscopy    ----- Message -----  From: Nicole Gage FNP-C  Sent: 6/6/2024  11:57 AM CDT  To: [facility]  Subject: EGD/Colonoscopy                                  Procedure: EGD/Colonoscopy    Diagnosis: Abdominal pain, GERD, Dysphagia, and Weight loss    Procedure Timing: Within 4 weeks (Urgent)    #If within 4 weeks selected, please aislinn as high priority#    #If greater than 12 weeks, please select "5-12 weeks" and delay sending until 3 months prior to requested date#     Location: Any Site    Additional Scheduling Information: No scheduling concerns    Prep Specifications:Standard prep    Is the patient taking a GLP-1 Agonist:no    Have you attached a patient to this message: yes  "

## 2024-06-15 ENCOUNTER — TELEPHONE (OUTPATIENT)
Dept: ENDOSCOPY | Facility: HOSPITAL | Age: 29
End: 2024-06-15
Payer: MEDICAID

## 2024-06-15 NOTE — TELEPHONE ENCOUNTER
Confirmed  egd/colonoscopy appt for 6/20/24 with pt. Confirmed receipt of prep instructions.pt has not picked up prep but waiting at pharmacy for him Reviewed instructions pt denies taking blood thinning or glp1 medications.Confirmed ride home after procedure.Pt verbalized understanding

## 2024-06-17 ENCOUNTER — CLINICAL SUPPORT (OUTPATIENT)
Dept: DERMATOLOGY | Facility: CLINIC | Age: 29
End: 2024-06-17
Payer: COMMERCIAL

## 2024-06-17 DIAGNOSIS — Z48.02 VISIT FOR SUTURE REMOVAL: Primary | ICD-10-CM

## 2024-06-17 PROCEDURE — 99999 PR PBB SHADOW E&M-EST. PATIENT-LVL I: CPT | Mod: PBBFAC,,,

## 2024-06-17 NOTE — PROGRESS NOTES
Patient presents for suture removal. Informed patient that pathology is still in process and will be contacted once results have been received and reviewed. The wound is well healed without signs of infection. The sutures had fallen out. Wound care and activity instructions given. Return prn.

## 2024-06-18 ENCOUNTER — LAB VISIT (OUTPATIENT)
Dept: LAB | Facility: HOSPITAL | Age: 29
End: 2024-06-18
Payer: MEDICAID

## 2024-06-18 DIAGNOSIS — R76.8 POSITIVE ANA (ANTINUCLEAR ANTIBODY): ICD-10-CM

## 2024-06-18 DIAGNOSIS — R10.13 EPIGASTRIC PAIN: ICD-10-CM

## 2024-06-18 DIAGNOSIS — R10.11 RUQ PAIN: ICD-10-CM

## 2024-06-18 LAB — LIPASE SERPL-CCNC: 38 U/L (ref 4–60)

## 2024-06-18 PROCEDURE — 86038 ANTINUCLEAR ANTIBODIES: CPT

## 2024-06-18 PROCEDURE — 36415 COLL VENOUS BLD VENIPUNCTURE: CPT

## 2024-06-18 PROCEDURE — 83690 ASSAY OF LIPASE: CPT

## 2024-06-19 ENCOUNTER — TELEPHONE (OUTPATIENT)
Dept: ENDOSCOPY | Facility: HOSPITAL | Age: 29
End: 2024-06-19
Payer: MEDICAID

## 2024-06-19 LAB
ANA SER QL IF: NORMAL
COMMENT: NORMAL
FINAL PATHOLOGIC DIAGNOSIS: NORMAL
GROSS: NORMAL
Lab: NORMAL
MICROSCOPIC EXAM: NORMAL

## 2024-06-19 NOTE — TELEPHONE ENCOUNTER
Pt Returning Called: Informed pt to arrive tomorrow for procedure one hour early at 1:00pm. Pt verbalize understanding.

## 2024-06-20 ENCOUNTER — ANESTHESIA EVENT (OUTPATIENT)
Dept: ENDOSCOPY | Facility: HOSPITAL | Age: 29
End: 2024-06-20
Payer: MEDICAID

## 2024-06-20 ENCOUNTER — ANESTHESIA (OUTPATIENT)
Dept: ENDOSCOPY | Facility: HOSPITAL | Age: 29
End: 2024-06-20
Payer: MEDICAID

## 2024-06-20 ENCOUNTER — HOSPITAL ENCOUNTER (OUTPATIENT)
Facility: HOSPITAL | Age: 29
Discharge: HOME | End: 2024-06-20
Attending: INTERNAL MEDICINE | Admitting: INTERNAL MEDICINE
Payer: MEDICAID

## 2024-06-20 VITALS
DIASTOLIC BLOOD PRESSURE: 78 MMHG | OXYGEN SATURATION: 100 % | SYSTOLIC BLOOD PRESSURE: 116 MMHG | HEIGHT: 69 IN | WEIGHT: 108 LBS | TEMPERATURE: 98 F | BODY MASS INDEX: 16 KG/M2 | RESPIRATION RATE: 15 BRPM | HEART RATE: 55 BPM

## 2024-06-20 DIAGNOSIS — K22.10 EROSIVE ESOPHAGITIS: Primary | ICD-10-CM

## 2024-06-20 DIAGNOSIS — R63.4 WEIGHT LOSS: ICD-10-CM

## 2024-06-20 DIAGNOSIS — K44.9 HIATAL HERNIA: ICD-10-CM

## 2024-06-20 DIAGNOSIS — R13.10 ODYNOPHAGIA: ICD-10-CM

## 2024-06-20 PROCEDURE — 37000008 HC ANESTHESIA 1ST 15 MINUTES: Performed by: INTERNAL MEDICINE

## 2024-06-20 PROCEDURE — 88313 SPECIAL STAINS GROUP 2: CPT | Mod: 26,,, | Performed by: PATHOLOGY

## 2024-06-20 PROCEDURE — 88342 IMHCHEM/IMCYTCHM 1ST ANTB: CPT | Performed by: PATHOLOGY

## 2024-06-20 PROCEDURE — 27201012 HC FORCEPS, HOT/COLD, DISP: Performed by: INTERNAL MEDICINE

## 2024-06-20 PROCEDURE — 88312 SPECIAL STAINS GROUP 1: CPT | Mod: 26,,, | Performed by: PATHOLOGY

## 2024-06-20 PROCEDURE — 37000009 HC ANESTHESIA EA ADD 15 MINS: Performed by: INTERNAL MEDICINE

## 2024-06-20 PROCEDURE — 43239 EGD BIOPSY SINGLE/MULTIPLE: CPT | Mod: ,,, | Performed by: INTERNAL MEDICINE

## 2024-06-20 PROCEDURE — 88305 TISSUE EXAM BY PATHOLOGIST: CPT | Mod: 26,,, | Performed by: PATHOLOGY

## 2024-06-20 PROCEDURE — 63600175 PHARM REV CODE 636 W HCPCS: Performed by: NURSE ANESTHETIST, CERTIFIED REGISTERED

## 2024-06-20 PROCEDURE — 25000003 PHARM REV CODE 250: Performed by: NURSE ANESTHETIST, CERTIFIED REGISTERED

## 2024-06-20 PROCEDURE — 88313 SPECIAL STAINS GROUP 2: CPT | Performed by: PATHOLOGY

## 2024-06-20 PROCEDURE — 88312 SPECIAL STAINS GROUP 1: CPT | Performed by: PATHOLOGY

## 2024-06-20 PROCEDURE — 45378 DIAGNOSTIC COLONOSCOPY: CPT | Performed by: INTERNAL MEDICINE

## 2024-06-20 PROCEDURE — 88342 IMHCHEM/IMCYTCHM 1ST ANTB: CPT | Mod: 26,,, | Performed by: PATHOLOGY

## 2024-06-20 PROCEDURE — 88305 TISSUE EXAM BY PATHOLOGIST: CPT | Performed by: PATHOLOGY

## 2024-06-20 PROCEDURE — 45378 DIAGNOSTIC COLONOSCOPY: CPT | Mod: ,,, | Performed by: INTERNAL MEDICINE

## 2024-06-20 PROCEDURE — 43239 EGD BIOPSY SINGLE/MULTIPLE: CPT | Performed by: INTERNAL MEDICINE

## 2024-06-20 RX ORDER — PROPOFOL 10 MG/ML
VIAL (ML) INTRAVENOUS
Status: DISCONTINUED | OUTPATIENT
Start: 2024-06-20 | End: 2024-06-20

## 2024-06-20 RX ORDER — OMEPRAZOLE 40 MG/1
40 CAPSULE, DELAYED RELEASE ORAL
Qty: 90 CAPSULE | Refills: 3 | Status: SHIPPED | OUTPATIENT
Start: 2024-06-20 | End: 2025-06-20

## 2024-06-20 RX ORDER — SODIUM CHLORIDE 9 MG/ML
INJECTION, SOLUTION INTRAVENOUS CONTINUOUS
Status: DISCONTINUED | OUTPATIENT
Start: 2024-06-20 | End: 2024-06-20 | Stop reason: HOSPADM

## 2024-06-20 RX ORDER — LIDOCAINE HYDROCHLORIDE 20 MG/ML
INJECTION INTRAVENOUS
Status: DISCONTINUED | OUTPATIENT
Start: 2024-06-20 | End: 2024-06-20

## 2024-06-20 RX ORDER — PROPOFOL 10 MG/ML
VIAL (ML) INTRAVENOUS CONTINUOUS PRN
Status: DISCONTINUED | OUTPATIENT
Start: 2024-06-20 | End: 2024-06-20

## 2024-06-20 RX ADMIN — PROPOFOL 20 MG: 10 INJECTION, EMULSION INTRAVENOUS at 04:06

## 2024-06-20 RX ADMIN — PROPOFOL 175 MCG/KG/MIN: 10 INJECTION, EMULSION INTRAVENOUS at 04:06

## 2024-06-20 RX ADMIN — PROPOFOL 30 MG: 10 INJECTION, EMULSION INTRAVENOUS at 04:06

## 2024-06-20 RX ADMIN — SODIUM CHLORIDE: 0.9 INJECTION, SOLUTION INTRAVENOUS at 03:06

## 2024-06-20 RX ADMIN — PROPOFOL 50 MG: 10 INJECTION, EMULSION INTRAVENOUS at 04:06

## 2024-06-20 RX ADMIN — LIDOCAINE HYDROCHLORIDE 50 MG: 20 INJECTION INTRAVENOUS at 04:06

## 2024-06-20 NOTE — PLAN OF CARE
Patient ready for discharge criteria met. Written and verbal Discharge instructions reviewed with patient. Understanding verbalized.

## 2024-06-20 NOTE — TRANSFER OF CARE
"Anesthesia Transfer of Care Note    Patient: Joseph Torrez    Procedure(s) Performed: Procedure(s) (LRB):  EGD (ESOPHAGOGASTRODUODENOSCOPY) (N/A)  COLONOSCOPY (N/A)    Patient location: Other: pt being recovered in procedure room    Anesthesia Type: general    Transport from OR: Transported from OR on 2-3 L/min O2 by NC with adequate spontaneous ventilation    Post pain: adequate analgesia    Post assessment: no apparent anesthetic complications and tolerated procedure well    Post vital signs: stable    Level of consciousness: sedated    Nausea/Vomiting: no nausea/vomiting    Complications: none    Transfer of care protocol was followed      Last vitals: Visit Vitals  /87   Pulse 83   Temp 37 °C (98.6 °F)   Resp 16   Ht 5' 9" (1.753 m)   Wt 49 kg (108 lb)   SpO2 100%   BMI 15.95 kg/m²     "

## 2024-06-20 NOTE — PROVATION PATIENT INSTRUCTIONS
Discharge Summary/Instructions after an Endoscopic Procedure  Patient Name: Joseph Torrez  Patient MRN: [id number]  Patient YOB: 1995  Thursday, June 20, 2024  Lucho Luo MD  Dear patient,  As a result of recent federal legislation (The Federal Cures Act), you may   receive lab or pathology results from your procedure in your [facility]   account before your physician is able to contact you. Your physician or   their representative will relay the results to you with their   recommendations at their soonest availability.  Thank you,  RESTRICTIONS:  During your procedure today, you received medications for sedation.  These   medications may affect your judgment, balance and coordination.  Therefore,   for 24 hours, you have the following restrictions:   - DO NOT drive a car, operate machinery, make legal/financial decisions,   sign important papers or drink alcohol.    ACTIVITY:  Today: no heavy lifting, straining or running due to procedural   sedation/anesthesia.  The following day: return to full activity including work.  DIET:  Eat and drink normally unless instructed otherwise.     TREATMENT FOR COMMON SIDE EFFECTS:  - Mild abdominal pain, nausea, belching, bloating or excessive gas:  rest,   eat lightly and use a heating pad.  - Sore Throat: treat with throat lozenges and/or gargle with warm salt   water.  - Because air was used during the procedure, expelling large amounts of air   from your rectum or belching is normal.  - If a bowel prep was taken, you may not have a bowel movement for 1-3 days.    This is normal.  SYMPTOMS TO WATCH FOR AND REPORT TO YOUR PHYSICIAN:  1. Abdominal pain or bloating, other than gas cramps.  2. Chest pain.  3. Back pain.  4. Signs of infection such as: chills or fever occurring within 24 hours   after the procedure.  5. Rectal bleeding, which would show as bright red, maroon, or black stools.   (A tablespoon of blood from the rectum is not serious, especially if    hemorrhoids are present.)  6. Vomiting.  7. Weakness or dizziness.  GO DIRECTLY TO THE NEAREST EMERGENCY ROOM IF YOU HAVE ANY OF THE FOLLOWING:      Difficulty breathing              Chills and/or fever over 101 F   Persistent vomiting and/or vomiting blood   Severe abdominal pain   Severe chest pain   Black, tarry stools   Bleeding- more than one tablespoon   Any other symptom or condition that you feel may need urgent attention  Your doctor recommends these additional instructions:  If any biopsies were taken, your doctors clinic will contact you in 1 to 2   weeks with any results.  - Discharge patient to home.   - Repeat colonoscopy at the next available appointment because the bowel   preparation was poor.   - Return to nurse practitioner at the next available appointment.   - Telephone nurse practitioner for study results tomorrow.   - Return to primary care physician.   - The findings and recommendations were discussed with the patient.  For questions, problems or results please call your physician - Lucho Luo MD at Work:  [phone].  [facility], EMERGENCY ROOM PHONE NUMBER: [phone]  IF A COMPLICATION OR EMERGENCY SITUATION ARISES AND YOU ARE UNABLE TO REACH   YOUR PHYSICIAN - GO DIRECTLY TO THE EMERGENCY ROOM.  Lucho Luo MD  6/20/2024 5:00:55 PM  This report has been verified and signed electronically.  Dear patient,  As a result of recent federal legislation (The Federal Cures Act), you may   receive lab or pathology results from your procedure in your [facility]   account before your physician is able to contact you. Your physician or   their representative will relay the results to you with their   recommendations at their soonest availability.  Thank you,  PROVATION

## 2024-06-20 NOTE — H&P
[facility] 4th Floor  History & Physical    Subjective:      Chief Complaint/Reason for Admission:     Direct access EGD and colonoscopy for   Diagnoses  Abdominal pain, unspecified abdominal location [R10.9]  Gastroesophageal reflux disease, unspecified whether esophagitis present [K21.9]  Dysphagia, unspecified type [R13.10]  Weight loss [R63.4]     Joseph Torrez is a 28 y.o. male.    Past Medical History:   Diagnosis Date    Panic disorder 02/01/2024     Past Surgical History:   Procedure Laterality Date    COLONOSCOPY       Social History     Tobacco Use    Smoking status: Never     Passive exposure: Never    Smokeless tobacco: Never   Substance Use Topics    Alcohol use: Not Currently    Drug use: Never       PTA Medications   Medication Sig    cholecalciferol, vitD3,/vit K2 (VITAMIN D3-VITAMIN K2 ORAL) Take 1 capsule by mouth once daily.    dextroamphetamine-amphetamine (ADDERALL XR) 15 MG 24 hr capsule Take 1 capsule (15 mg total) by mouth every morning.    omega-3 fatty acids/fish oil (FISH OIL-OMEGA-3 FATTY ACIDS) 300-1,000 mg capsule Take 1 capsule by mouth once daily.    docusate sodium (COLACE) 100 MG capsule Take 100 mg by mouth once daily.    famotidine (PEPCID) 40 MG tablet Take 1 tablet (40 mg total) by mouth once daily.    ferrous sulfate 325 (65 FE) MG EC tablet Take 325 mg by mouth once daily.    hydrocortisone 2.5 % cream Apply topically 2 (two) times daily.     Review of patient's allergies indicates:  No Known Allergies     Review of Systems   Constitutional:  Positive for weight loss. Negative for fever.       Objective:      Vital Signs (Most Recent)  Temp: 98.6 °F (37 °C) (06/20/24 1333)  Pulse: 83 (06/20/24 1333)  Resp: 16 (06/20/24 1333)  BP: 137/87 (06/20/24 1333)  SpO2: 100 % (06/20/24 1333)    Vital Signs Range (Last 24H):  Temp:  [98.6 °F (37 °C)]   Pulse:  [83]   Resp:  [16]   BP: (137)/(87)   SpO2:  [100 %]     Physical Exam  Cardiovascular:      Rate and Rhythm: Normal  rate.   Pulmonary:      Effort: Pulmonary effort is normal.   Neurological:      Mental Status: He is alert and oriented to person, place, and time.           Assessment:        Direct access EGD and colonoscopy for   Diagnoses  Abdominal pain, unspecified abdominal location [R10.9]  Gastroesophageal reflux disease, unspecified whether esophagitis present [K21.9]  Dysphagia, unspecified type [R13.10]  Weight loss [R63.4]       Plan:        Direct access EGD and colonoscopy for   Diagnoses  Abdominal pain, unspecified abdominal location [R10.9]  Gastroesophageal reflux disease, unspecified whether esophagitis present [K21.9]  Dysphagia, unspecified type [R13.10]  Weight loss [R63.4]

## 2024-06-20 NOTE — PROVATION PATIENT INSTRUCTIONS
Discharge Summary/Instructions after an Endoscopic Procedure  Patient Name: Joseph Torrez  Patient MRN: [id number]  Patient YOB: 1995  Thursday, June 20, 2024  Lucho Luo MD  Dear patient,  As a result of recent federal legislation (The Federal Cures Act), you may   receive lab or pathology results from your procedure in your [facility]   account before your physician is able to contact you. Your physician or   their representative will relay the results to you with their   recommendations at their soonest availability.  Thank you,  RESTRICTIONS:  During your procedure today, you received medications for sedation.  These   medications may affect your judgment, balance and coordination.  Therefore,   for 24 hours, you have the following restrictions:   - DO NOT drive a car, operate machinery, make legal/financial decisions,   sign important papers or drink alcohol.    ACTIVITY:  Today: no heavy lifting, straining or running due to procedural   sedation/anesthesia.  The following day: return to full activity including work.  DIET:  Eat and drink normally unless instructed otherwise.     TREATMENT FOR COMMON SIDE EFFECTS:  - Mild abdominal pain, nausea, belching, bloating or excessive gas:  rest,   eat lightly and use a heating pad.  - Sore Throat: treat with throat lozenges and/or gargle with warm salt   water.  - Because air was used during the procedure, expelling large amounts of air   from your rectum or belching is normal.  - If a bowel prep was taken, you may not have a bowel movement for 1-3 days.    This is normal.  SYMPTOMS TO WATCH FOR AND REPORT TO YOUR PHYSICIAN:  1. Abdominal pain or bloating, other than gas cramps.  2. Chest pain.  3. Back pain.  4. Signs of infection such as: chills or fever occurring within 24 hours   after the procedure.  5. Rectal bleeding, which would show as bright red, maroon, or black stools.   (A tablespoon of blood from the rectum is not serious, especially if    hemorrhoids are present.)  6. Vomiting.  7. Weakness or dizziness.  GO DIRECTLY TO THE NEAREST EMERGENCY ROOM IF YOU HAVE ANY OF THE FOLLOWING:      Difficulty breathing              Chills and/or fever over 101 F   Persistent vomiting and/or vomiting blood   Severe abdominal pain   Severe chest pain   Black, tarry stools   Bleeding- more than one tablespoon   Any other symptom or condition that you feel may need urgent attention  Your doctor recommends these additional instructions:  If any biopsies were taken, your doctors clinic will contact you in 1 to 2   weeks with any results.  - Discharge patient to home.   - Follow an antireflux regimen indefinitely.   - Use Prilosec (omeprazole) 40 mg by mouth once daily.Best taken 45-60   minutes before your first protein meal of the day - Breakfast.   - Repeat upper endoscopy in 12 weeks to check healing.   - Await pathology results.   - Telephone endoscopist for pathology results in 3 weeks.   - Return to nurse practitioner at the next available appointment.   - Telephone nurse practitioner for study results in 2 weeks.   - The findings and recommendations were discussed with the patient.  For questions, problems or results please call your physician - Lucho Luo MD at Work:  [phone].  [facility], EMERGENCY ROOM PHONE NUMBER: [phone]  IF A COMPLICATION OR EMERGENCY SITUATION ARISES AND YOU ARE UNABLE TO REACH   YOUR PHYSICIAN - GO DIRECTLY TO THE EMERGENCY ROOM.  Lucho Luo MD  6/20/2024 5:01:55 PM  This report has been verified and signed electronically.  Dear patient,  As a result of recent federal legislation (The Federal Cures Act), you may   receive lab or pathology results from your procedure in your [facility]   account before your physician is able to contact you. Your physician or   their representative will relay the results to you with their   recommendations at their soonest availability.  Thank you,  PROVATION

## 2024-06-20 NOTE — ANESTHESIA PREPROCEDURE EVALUATION
06/20/2024  Joseph Torrez is a 28 y.o., male.    Past Medical History:   Diagnosis Date    Panic disorder 02/01/2024     Patient Active Problem List   Diagnosis    Attention deficit hyperactivity disorder (ADHD), combined type    Irritable bowel syndrome with both constipation and diarrhea    Epigastric pain    History of psoriasis    Weight loss    Need for HPV vaccination    RUQ pain    Esophageal dysphagia    Gastroesophageal reflux disease without esophagitis    Body mass index (BMI) less than 19 in adult     Social History     Socioeconomic History    Marital status: Single   Tobacco Use    Smoking status: Never     Passive exposure: Never    Smokeless tobacco: Never   Substance and Sexual Activity    Alcohol use: Not Currently    Drug use: Never    Sexual activity: Not Currently     Partners: Female     Birth control/protection: Condom     Social Determinants of Health     Financial Resource Strain: High Risk (1/31/2024)    Overall Financial Resource Strain (CARDIA)     Difficulty of Paying Living Expenses: Hard   Food Insecurity: Patient Declined (1/31/2024)    Hunger Vital Sign     Worried About Running Out of Food in the Last Year: Patient declined     Ran Out of Food in the Last Year: Patient declined   Transportation Needs: No Transportation Needs (1/31/2024)    PRAPARE - Transportation     Lack of Transportation (Medical): No     Lack of Transportation (Non-Medical): No   Physical Activity: Insufficiently Active (1/31/2024)    Exercise Vital Sign     Days of Exercise per Week: 3 days     Minutes of Exercise per Session: 40 min   Stress: No Stress Concern Present (1/31/2024)    [employer] - Occupational Stress Questionnaire     Feeling of Stress : Only a little   Housing Stability: Patient Declined (1/31/2024)    Housing Stability Vital Sign     Unable to Pay for  Housing in the Last Year: Patient declined     Unstable Housing in the Last Year: Patient declined     Past Surgical History:   Procedure Laterality Date    COLONOSCOPY       No current facility-administered medications on file prior to encounter.     Current Outpatient Medications on File Prior to Encounter   Medication Sig Dispense Refill    cholecalciferol, vitD3,/vit K2 (VITAMIN D3-VITAMIN K2 ORAL) Take 1 capsule by mouth once daily.      dextroamphetamine-amphetamine (ADDERALL XR) 15 MG 24 hr capsule Take 1 capsule (15 mg total) by mouth every morning. 90 capsule 0    omega-3 fatty acids/fish oil (FISH OIL-OMEGA-3 FATTY ACIDS) 300-1,000 mg capsule Take 1 capsule by mouth once daily.      docusate sodium (COLACE) 100 MG capsule Take 100 mg by mouth once daily.      famotidine (PEPCID) 40 MG tablet Take 1 tablet (40 mg total) by mouth once daily. 30 tablet 11    ferrous sulfate 325 (65 FE) MG EC tablet Take 325 mg by mouth once daily.      hydrocortisone 2.5 % cream Apply topically 2 (two) times daily. 28 g 0     Pre-op Assessment    I have reviewed the Patient Summary Reports.     I have reviewed the Nursing Notes. I have reviewed the NPO Status.   I have reviewed the Medications.     Review of Systems  Anesthesia Hx:  No problems with previous Anesthesia   Neg history of prior surgery.          Denies Family Hx of Anesthesia complications.    Denies Personal Hx of Anesthesia complications.                    Social:  No Alcohol Use, Non-Smoker       EENT/Dental:  EENT/Dental Normal           Cardiovascular:  Cardiovascular Normal                                            Pulmonary:  Pulmonary Normal                       Renal/:  Renal/ Normal                 Hepatic/GI:     GERD             Musculoskeletal:  Musculoskeletal Normal                Neurological:  Neurology Normal                                      Psych:     ADD  Attention Deficit Disorder.             Physical Exam  General: Well  nourished, Cooperative, Alert and Oriented    Airway:  Mallampati: I / I  Mouth Opening: Normal  TM Distance: Normal  Tongue: Normal  Neck ROM: Normal ROM        Anesthesia Plan  Type of Anesthesia, risks & benefits discussed:    Anesthesia Type: Gen Natural Airway  Intra-op Monitoring Plan: Standard ASA Monitors  Induction:  IV  Informed Consent: Informed consent signed with the Patient and all parties understand the risks and agree with anesthesia plan.  All questions answered.   ASA Score: 2  Day of Surgery Review of History & Physical: H&P Update referred to the surgeon/provider.    Ready For Surgery From Anesthesia Perspective.     .

## 2024-06-21 ENCOUNTER — PATIENT MESSAGE (OUTPATIENT)
Dept: GASTROENTEROLOGY | Facility: HOSPITAL | Age: 29
End: 2024-06-21
Payer: MEDICAID

## 2024-06-21 ENCOUNTER — TELEPHONE (OUTPATIENT)
Dept: ENDOSCOPY | Facility: HOSPITAL | Age: 29
End: 2024-06-21
Payer: MEDICAID

## 2024-06-21 ENCOUNTER — PATIENT MESSAGE (OUTPATIENT)
Dept: GASTROENTEROLOGY | Facility: CLINIC | Age: 29
End: 2024-06-21
Payer: MEDICAID

## 2024-06-21 DIAGNOSIS — K29.80 DUODENITIS WITHOUT BLEEDING: Primary | ICD-10-CM

## 2024-06-21 DIAGNOSIS — R13.10 DYSPHAGIA, UNSPECIFIED TYPE: ICD-10-CM

## 2024-06-21 DIAGNOSIS — R63.4 WEIGHT LOSS: ICD-10-CM

## 2024-06-21 DIAGNOSIS — R10.9 ABDOMINAL PAIN, UNSPECIFIED ABDOMINAL LOCATION: Primary | ICD-10-CM

## 2024-06-21 DIAGNOSIS — K22.10 EROSIVE ESOPHAGITIS: ICD-10-CM

## 2024-06-21 NOTE — ANESTHESIA POSTPROCEDURE EVALUATION
Anesthesia Post Evaluation    Patient: Joseph Torrez    Procedure(s) Performed: Procedure(s) (LRB):  EGD (ESOPHAGOGASTRODUODENOSCOPY) (N/A)  COLONOSCOPY (N/A)    Final Anesthesia Type: general      Patient location during evaluation: PACU  Patient participation: Yes- Able to Participate  Level of consciousness: awake and alert and oriented  Post-procedure vital signs: reviewed and stable  Pain management: adequate  Airway patency: patent    PONV status at discharge: No PONV  Anesthetic complications: no      Cardiovascular status: hemodynamically stable  Respiratory status: unassisted  Hydration status: euvolemic  Follow-up not needed.              Vitals Value Taken Time   /78 06/20/24 1724   Temp 36.8 °C (98.2 °F) 06/20/24 1650   Pulse 55 06/20/24 1724   Resp 15 06/20/24 1724   SpO2 100 % 06/20/24 1724         Event Time   Out of Recovery 17:35:40         Pain/Angel Score: Angel Score: 10 (6/20/2024  5:06 PM)

## 2024-06-21 NOTE — TELEPHONE ENCOUNTER
"----- Message from Dixie Slater sent at 2024  8:53 AM CDT -----  Regarding: FW: EGD/Colonoscopy    ----- Message -----  From: Essence Gaitan NP  Sent: 2024   7:45 AM CDT  To: [facility]  Subject: EGD/Colonoscopy                                  Procedure: EGD/Colonoscopy    Diagnosis: Abdominal pain, GERD, Dysphagia, Weight loss, and Esophagitis    Procedure Timin weeks    #If within 4 weeks selected, please aislinn as high priority#    #If greater than 12 weeks, please select "5-12 weeks" and delay sending until 3 months prior to requested date#     Location: Any Site    Additional Scheduling Information: No scheduling concerns    Prep Specifications:Extended/Constipation prep    Is the patient taking a GLP-1 Agonist:no    Have you attached a patient to this message: yes  "

## 2024-06-21 NOTE — TELEPHONE ENCOUNTER
Attempted to reach patient to schedule a repeat EGD/Colonoscopy. No answer. Left message for patient to call.

## 2024-06-21 NOTE — TELEPHONE ENCOUNTER
Spoke to pt to schedule procedure(s) Colonoscopy       Physician to perform procedure(s) Dr. JESSICA Kaba  Date of Procedure (s) 9/23/24  Arrival Time 1:15 PM  Time of Procedure(s) 2:15 PM   Location of Procedure(s) [facility] Floor  Type of Rx Prep sent to patient: Miralax Extended prep  Instructions provided to patient via [facility]    Patient was informed on the following information and verbalized understanding. Screening questionnaire reviewed with patient and complete. If procedure requires anesthesia, a responsible adult needs to be present to accompany the patient home, patient cannot drive after receiving anesthesia. Appointment details are tentative, especially check-in time. Patient will receive a prep-op call 7 days prior to confirm check-in time for procedure. If applicable the patient should contact their pharmacy to verify Rx for procedure prep is ready for pick-up. Patient was advised to call the scheduling department at [phone] if pharmacy states no Rx is available. Patient was advised to call the endoscopy scheduling department if any questions or concerns arise.      [facility] Scheduling Department

## 2024-06-21 NOTE — TELEPHONE ENCOUNTER
"From: Essence Gaitan NP  Sent: 2024   7:45 AM CDT  To: [facility]  Subject: EGD/Colonoscopy                                   Procedure: EGD/Colonoscopy     Diagnosis: Abdominal pain, GERD, Dysphagia, Weight loss, and Esophagitis     Procedure Timin weeks     #If within 4 weeks selected, please aislinn as high priority#     #If greater than 12 weeks, please select "5-12 weeks" and delay sending until 3 months prior to requested date#      Location: Any Site     Additional Scheduling Information: No scheduling concerns     Prep Specifications:Extended/Constipation prep     Is the patient taking a GLP-1 Agonist:no     Have you attached a patient to this message: yes     "

## 2024-06-24 ENCOUNTER — PATIENT MESSAGE (OUTPATIENT)
Dept: GASTROENTEROLOGY | Facility: CLINIC | Age: 29
End: 2024-06-24
Payer: MEDICAID

## 2024-06-24 LAB
FINAL PATHOLOGIC DIAGNOSIS: NORMAL
GROSS: NORMAL
Lab: NORMAL
MICROSCOPIC EXAM: NORMAL

## 2024-06-24 NOTE — PROGRESS NOTES
Essence Jerri Joseph's EGD showed erosive esophagitis so very important that you take your omeprazole which is Prilosec 40 mg once daily best taken about 60 minutes before your 1st protein meal of the day breakfast recommend repeat EGD about 12 weeks to check for erosive esophagitis healing.    As you recall your colonoscopy bowel prep was poor so you need to have that repeated again along with your EGD    Your EGD pathology was benign    1. Duodenum (biopsy):  Minimal/mild active duodenitis with reactive changes  Considerations include peptic disease  No support for celiac disease  No pathogens identified (GMS and PAS D stains negative)  No morphologic support for amyloid.    2. Stomach (biopsy):  Mild chronic antral and oxyntic gastritis, no activity  Glandular changes suggestive of treatment (ppi) affect  No Helicobacter organisms (routine and immunostain)    3. Esophagus, distal mid and proximal (biopsy):  No intestinal metaplasia, no dysplasia  Squamous mucosa with mild reactive changes, nonspecific  May represent reflux disease  No support for eosinophilic esophagitis     Comment: Interp By Ramya Carver M.D., Signed on 06/24/2024 at 10:12  Microscopic Exam 1. GMS: Negative for fungus  PAS D:  Negative for organisms    2. Helicobacter:  Negative    All stains performed with appropriate controls.

## 2024-06-25 ENCOUNTER — HOSPITAL ENCOUNTER (OUTPATIENT)
Dept: RADIOLOGY | Facility: HOSPITAL | Age: 29
Discharge: HOME | End: 2024-06-25
Payer: MEDICAID

## 2024-06-25 DIAGNOSIS — K29.80 DUODENITIS WITHOUT BLEEDING: ICD-10-CM

## 2024-06-25 PROCEDURE — A9698 NON-RAD CONTRAST MATERIALNOC: HCPCS

## 2024-06-25 PROCEDURE — 74177 CT ABD & PELVIS W/CONTRAST: CPT | Mod: 26,,, | Performed by: RADIOLOGY

## 2024-06-25 PROCEDURE — 25500020 PHARM REV CODE 255

## 2024-06-25 PROCEDURE — 74177 CT ABD & PELVIS W/CONTRAST: CPT | Mod: TC

## 2024-06-25 RX ADMIN — IOHEXOL 100 ML: 350 INJECTION, SOLUTION INTRAVENOUS at 07:06

## 2024-06-25 RX ADMIN — BARIUM SULFATE 1350 ML: 1 SUSPENSION ORAL at 07:06

## 2024-06-26 ENCOUNTER — PATIENT MESSAGE (OUTPATIENT)
Dept: GASTROENTEROLOGY | Facility: HOSPITAL | Age: 29
End: 2024-06-26
Payer: MEDICAID

## 2024-06-26 DIAGNOSIS — K55.1 SMAS (SUPERIOR MESENTERIC ARTERY SYNDROME): Primary | ICD-10-CM

## 2024-06-26 DIAGNOSIS — K83.8 COMMON BILE DUCT DILATION: ICD-10-CM

## 2024-07-01 ENCOUNTER — HOSPITAL ENCOUNTER (OUTPATIENT)
Dept: RADIOLOGY | Facility: HOSPITAL | Age: 29
Discharge: HOME | End: 2024-07-01
Payer: MEDICAID

## 2024-07-01 DIAGNOSIS — K83.8 COMMON BILE DUCT DILATION: ICD-10-CM

## 2024-07-01 DIAGNOSIS — K55.1 SMAS (SUPERIOR MESENTERIC ARTERY SYNDROME): ICD-10-CM

## 2024-07-01 PROCEDURE — A9585 GADOBUTROL INJECTION: HCPCS

## 2024-07-01 PROCEDURE — 74183 MRI ABD W/O CNTR FLWD CNTR: CPT | Mod: TC

## 2024-07-01 PROCEDURE — 76376 3D RENDER W/INTRP POSTPROCES: CPT | Mod: TC

## 2024-07-01 PROCEDURE — 25500020 PHARM REV CODE 255

## 2024-07-01 RX ORDER — GADOBUTROL 604.72 MG/ML
10 INJECTION INTRAVENOUS
Status: COMPLETED | OUTPATIENT
Start: 2024-07-01 | End: 2024-07-01

## 2024-07-01 RX ADMIN — GADOBUTROL 10 ML: 604.72 INJECTION INTRAVENOUS at 06:07

## 2024-07-08 ENCOUNTER — HOSPITAL ENCOUNTER (OUTPATIENT)
Dept: CARDIOLOGY | Facility: HOSPITAL | Age: 29
Discharge: HOME | End: 2024-07-08
Payer: MEDICAID

## 2024-07-08 DIAGNOSIS — K55.1 SMAS (SUPERIOR MESENTERIC ARTERY SYNDROME): ICD-10-CM

## 2024-07-08 LAB
AORTA PROX PSV: 184 CM/S
CELIAC ORIGIN PSV: 194 CM/S
COM HEPATIC PSV: 260 CM/S
DIST SMA PSV: 196 CM/S
IMA ORIGIN PSV: 206 CM/S
MID IMA PSV: 89 CM/S
MID SMA PSV: 247 CM/S
PROX IMA PSV: 153 CM/S
PROX SMA PSV: 300 CM/S
SMA ORIGIN PSV: 322 CM/S
SPLENIC PSV: 167 CM/S

## 2024-07-08 PROCEDURE — 93975 VASCULAR STUDY: CPT | Mod: 26,,, | Performed by: INTERNAL MEDICINE

## 2024-07-09 ENCOUNTER — PATIENT MESSAGE (OUTPATIENT)
Dept: GASTROENTEROLOGY | Facility: HOSPITAL | Age: 29
End: 2024-07-09
Payer: MEDICAID

## 2024-07-09 ENCOUNTER — PATIENT MESSAGE (OUTPATIENT)
Dept: GASTROENTEROLOGY | Facility: CLINIC | Age: 29
End: 2024-07-09
Payer: MEDICAID

## 2024-07-09 DIAGNOSIS — K55.1 SMAS (SUPERIOR MESENTERIC ARTERY SYNDROME): Primary | ICD-10-CM

## 2024-07-09 DIAGNOSIS — K55.059: ICD-10-CM

## 2024-07-09 NOTE — TELEPHONE ENCOUNTER
Spoke to patient over the phone and reviewed his MRI and Mesenteric US results. Will ensure dietician appt scheduled for patient as well. Referrals placed for Vasc Surg and Gen Surg for further evaluation.    family member

## 2024-07-10 ENCOUNTER — TELEPHONE (OUTPATIENT)
Dept: SURGERY | Facility: CLINIC | Age: 29
End: 2024-07-10
Payer: MEDICAID

## 2024-07-10 DIAGNOSIS — K55.1 SMAS (SUPERIOR MESENTERIC ARTERY SYNDROME): Primary | ICD-10-CM

## 2024-07-10 DIAGNOSIS — K83.8 COMMON BILE DUCT DILATION: ICD-10-CM

## 2024-07-10 NOTE — TELEPHONE ENCOUNTER
Spoke with patient  Virtual appt scheduled for 7/16 at 5:45 pm  Next in-person visit was too far in the future

## 2024-07-10 NOTE — TELEPHONE ENCOUNTER
----- Message from Hany Kearns MD sent at 7/9/2024  4:51 PM CDT -----  Yes, this is for me.  ----- Message -----  From: Ayaka Richter RN  Sent: 7/9/2024   4:24 PM CDT  To: Hany Kearns MD    Is this us in general surgery?  ----- Message -----  From: Robinson Goodman MD  Sent: 7/9/2024   4:20 PM CDT  To: Hany Kearns MD; Essence Gaitan NP; #    He doesn't need vascular surgery this is really a GI surgical issue.     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 He has a CT and mesenteric US. Can you review and see if you can get him into clinic to discuss duodenal bypass?    Thanks  Robinson  ----- Message -----  From: Essence Gaitan NP  Sent: 7/9/2024  11:31 AM CDT  To: Robinson Goodman MD    If you have a recommendation that would be great! I sent referrals for surgery and vascular surgery. I did a mesenteric US and it showed some pretty significant stenosis (>70%).  ----- Message -----  From: Robinson Goodman MD  Sent: 7/8/2024  11:28 AM CDT  To: Essence Gaitan NP; Kunal Lewis MD    Sure thing, essence let me know if you need me to help set him up with a surgeon  ----- Message -----  From: Essence Gaitan NP  Sent: 7/8/2024   7:23 AM CDT  To: Robinson Goodman MD; Kunal Lewis MD    Appreciate both yall input with this patient! Thank you!  ----- Message -----  From: Kunal Lewis MD  Sent: 7/5/2024   9:51 AM CDT  To: Essence Gaitan NP; Robinson Goodman MD    Thank you, Robinson. Appreciate your input!    Essence- Would follow-up with whatever plans you and Dr. Luo had for his SMA syndrome. He'll need to see a surgeon for this, and you can discuss with Dr. Luo on who he would like to get involved. Regarding his biliary dilation, I would just plan for a repeat MRI/MRCP and LFTs in about 6 months. I'm happy to review those images with you at that time.  ----- Message -----  From: Robinson Goodman,  "MD  Sent: 7/5/2024   8:18 AM CDT  To: Essence Gaitan NP; MD Lesly Aguilar I agree with you, I think that is pretty underwhelming. I don't think its unreasonable to follow with a periodic MRCP but unless theres a reason I'm missing I can't imagine an intervention here and don't see a clear correlate to the symptoms described below.    I do think he has a clear SMA syndrome on CT and should be referred to a surgeon for that. I agree with nutrition consults and working on weight gain, but if he has lost enough weight to demonstrate such a dramatic radiographic SMA syndrome he's unlikely to be able to get enough calories by mouth to significantly increase his weight to overcome it.    My 0.2 without having met or gathered a history with him  Robinson  ----- Message -----  From: Kunal Lewis MD  Sent: 7/3/2024   8:48 AM CDT  To: Essence Gaitan NP; MD Robinson Ott- When you get a chance, can you review the images on this [employer] student. Do you think he has type 4a choledochal cyst? I was a little underwhelmed by the extrahepatic dilation, but would appreciate your thoughts. Thanks.  ----- Message -----  From: Essence Gaitan NP  Sent: 7/2/2024   3:45 PM CDT  To: MD Lesly Aguilar Dr.:  This is a patient that I saw in clinic for multiple GI symptoms including weight loss, abdominal pain, dysphagia, GERD. We ordered urgent EGD/Colonoscopy and Dr. Luo completed those on 6/21 and was concerned for SMA syndrome along with esophagitis. PPI initiated, Dietician referral placed to improve his nutritional status. CT enterography completed that did show some narrowing at the 3rd part of the duodenum as well as some extrahepatic biliary ductal dilatation so we obtained an MRCP. His MRCP noted "mild fusiform dilatation of the common bile duct as well as areas of fusiform dilatation in the central intrahepatic biliary ducts.  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  Correlation with ERCP is recommended." Can you review this further and see what he needs done next? Happy to place an e-consult if that is a better way to do this.    Of note, he is a [employer] resident here. Super sweet gisella. I appreciate your help!     Essence"

## 2024-07-12 ENCOUNTER — NUTRITION (OUTPATIENT)
Dept: GASTROENTEROLOGY | Facility: CLINIC | Age: 29
End: 2024-07-12
Payer: MEDICAID

## 2024-07-12 DIAGNOSIS — Z71.9 ENCOUNTER FOR HEALTH EDUCATION: Primary | ICD-10-CM

## 2024-07-12 NOTE — PROGRESS NOTES
"Referring Provider: Essence Gaitan NP  Reason for Nutrition Referral: High-Calorie Nutrition Therapy for weight gain    Patient Information: Joseph Torrez 28 y.o.-year-old  male   Nutrition-related concerns: Pt presents for weight management with goal of weight gain.           A = Nutrition Assessment  Anthropometric Data Estimated body mass index is 15.95 kg/m² as calculated from the following:    Height as of 6/20/24: 5' 9" (1.753 m).    Weight as of 6/20/24: 49 kg (108 lb).    Last 3 Weights:   Wt Readings from Last 3 Encounters:   06/20/24 49 kg (108 lb)   06/06/24 50.7 kg (111 lb 12.4 oz)   05/01/24 52.5 kg (115 lb 11.9 oz)      Biochemical Data Labs:   No results found for: "[id number]", "[id number]"  Lab Results   Component Value Date    HGB 16.1 06/07/2024    HCT 46.8 06/07/2024     Lab Results   Component Value Date    ALBUMIN 4.4 06/07/2024     Hemoglobin A1C   Date Value Ref Range Status   02/15/2024 4.6 4.0 - 5.6 % Final     Comment:     ADA Screening Guidelines:  5.7-6.4%  Consistent with prediabetes  >or=6.5%  Consistent with diabetes    High levels of fetal hemoglobin interfere with the HbA1C  assay. Heterozygous hemoglobin variants (HbS, HgC, etc)do  not significantly interfere with this assay.   However, presence of multiple variants may affect accuracy.        Dietary Data  Appetite: Good  Dietary Intake:  Breakfast:  Smoothie and greek yogurt (honey)  Lunch:  Dinner leftovers (tofu, paneer, chickpeas)  Dinner:  Tofu, paneer, chickpeas, naan  Other   Tofu, lentils,   Pt meal preps on weekends (eats it for lunch and dinner)  Trouble eating at night  Whole milk, DHA     Other Data:                       Food Allergies/intolerances: Pt is lacto-ovo vegetarian  Dx: Weight management with goal of weight gain     D = Nutrition Diagnosis   Patient Assessment: Patient is at increased nutrition risk due to decreasing weight and need for diet education to manage symptoms and ensure adequate " nutrition.    Patient knowledge of healthy eating and exercise patterns was assessed to be adequate. Session was spent educating patient on high-calorie, high-protein food and drink options (suggestions given with handouts). Emphasis placed on eating smaller, more frequent meals and snacks as well as nutrition supplement beverage options to optimize nutrient intake and digestion. Reviewed strategies for choosing and consuming balanced meals as well as snacks regularly.      Advised patient of need to add nutrition supplement beverages 1-2x daily (recommended Ensure Plus or Boost Plus).  Patient verbalized understanding.  Provided RD contact information for concerns or questions. Expect adequate compliance with dietary recommendations at this time.     Education Materials provided:   1. Academy of Nutrition and Dietetics/ NCM: High-Calorie, High Protein Nutrition Therapy; Full-liquid Nutrition Therapy; High-Calorie Liquid Nutrition Therapy; General, Healthful Vegetarian Nutrition Therapy  2. [employer]: Increasing Calories, Struggling to Gain Weight; Increasing Protein     I = Nutrition Intervention    Recommendation #1 Implement a balanced, regular eating pattern of 3 meals, 1-2 snacks daily. Avoid skipped meals. Eat smaller meals and snacks every 2-3 hours.   Recommendation #2 Recommend liberal use of high-calorie foods like oil, ghee/butter, cheese, eggs, avocado, whole milk, cream, etc    Recommendation #3 Add a nutrition-supplement drink 1-2x/daily between meals to ensure meeting calorie and vitamin/mineral needs until weight is stable and appetite/tolerance for solid foods improves (Recommend Boost Plus or Ensure Plus)   Recommendation #4 Follow up as necessary per weight changes and further education needs      M = Nutrition Monitoring   Indicator 1. Diet recall    Indicator 2. Weight/BMI      E= Nutrition Evaluation   Goal 1. Diet recall shows good compliance with recommendations reviewed during  session    Goal 2. Weight shows trend towards goal of weight gain to previous healthy adult body weight      Consultation Time: 50 Minutes  Follow Up: Patient provided with Dietitian contact number and advised to call or make future appointment if further consultation is needed/desired.    Communication with provider via Epic  Signature: Eli Henry, MPH, RDN, LDN

## 2024-07-16 PROBLEM — K55.1 SMAS (SUPERIOR MESENTERIC ARTERY SYNDROME): Status: ACTIVE | Noted: 2024-07-16

## 2024-07-23 ENCOUNTER — OFFICE VISIT (OUTPATIENT)
Dept: SURGERY | Facility: CLINIC | Age: 29
End: 2024-07-23
Payer: MEDICAID

## 2024-07-23 DIAGNOSIS — K55.1 SMAS (SUPERIOR MESENTERIC ARTERY SYNDROME): Primary | ICD-10-CM

## 2024-07-23 PROCEDURE — 1160F RVW MEDS BY RX/DR IN RCRD: CPT | Mod: CPTII,95,, | Performed by: SURGERY

## 2024-07-23 PROCEDURE — 1159F MED LIST DOCD IN RCRD: CPT | Mod: CPTII,95,, | Performed by: SURGERY

## 2024-07-23 PROCEDURE — 3044F HG A1C LEVEL LT 7.0%: CPT | Mod: CPTII,95,, | Performed by: SURGERY

## 2024-07-23 PROCEDURE — 99204 OFFICE O/P NEW MOD 45 MIN: CPT | Mod: 95,,, | Performed by: SURGERY

## 2024-07-23 NOTE — LETTER
July 23, 2024        Barbara Maya MD  [address] [place] [address]             [facility]  [address]  [facility] [address]  Phone: [phone]   Patient: Joseph Torrez   MR Number: [id number]   YOB: 1995   Date of Visit: 7/23/2024       Dear Dr. Maya:    Thank you for referring Joseph Torrez to me for evaluation. Attached you will find relevant portions of my assessment and plan of care.    If you have questions, please do not hesitate to call me. I look forward to following Joseph Torrez along with you.    Sincerely,      Hany Kearns MD            CC    No Recipients    Enclosure

## 2024-07-23 NOTE — PROGRESS NOTES
The patient location is: home  The chief complaint leading to consultation is: possible smas    Visit type: audiovisual      27 minutes of total time spent on the encounter, which includes face to face time and non-face to face time preparing to see the patient (eg, review of tests), Obtaining and/or reviewing separately obtained history, Documenting clinical information in the electronic or other health record, Independently interpreting results (not separately reported) and communicating results to the patient/family/caregiver, or Care coordination (not separately reported).         Each patient to whom he or she provides medical services by telemedicine is:  (1) informed of the relationship between the physician and patient and the respective role of any other health care provider with respect to management of the patient; and (2) notified that he or she may decline to receive medical services by telemedicine and may withdraw from such care at any time.    Notes:     History & Physical    SUBJECTIVE:     History of Present Illness:  Patient is a 28 y.o. male presents with bmi 19, ibs, adhd, psoriasis, gerd and possibly smas.  He started having trouble 2 years ago.  Initially he had jaundice and ruq pain.  He took a year off from school for this.  During this time he was not able to gaining weight due to diarrhea and pain.  He gets full on small amounts of food, feels regurgitation and stops.  He does better with oral fluids.  Ruq pain occurs only with fatty foods and epigastric pain occurs with eating too much.  He does have some food fear.  He says he has always been thin.  He was 128-9 prior to this illness and is not 120.    No chief complaint on file.      Review of patient's allergies indicates:  No Known Allergies    Current Outpatient Medications   Medication Sig Dispense Refill    cholecalciferol, vitD3,/vit K2 (VITAMIN D3-VITAMIN K2 ORAL) Take 1 capsule by mouth once daily.       dextroamphetamine-amphetamine (ADDERALL XR) 15 MG 24 hr capsule Take 1 capsule (15 mg total) by mouth every morning. 90 capsule 0    ferrous sulfate 325 (65 FE) MG EC tablet Take 325 mg by mouth once daily.      hydrocortisone 2.5 % cream Apply topically 2 (two) times daily. 28 g 0    omega-3 fatty acids/fish oil (FISH OIL-OMEGA-3 FATTY ACIDS) 300-1,000 mg capsule Take 1 capsule by mouth once daily.      omeprazole (PRILOSEC) 40 MG capsule Take 1 capsule (40 mg total) by mouth before breakfast. Best taken 45-60 minutes before your first protein meal of the day- Breakfast. 90 capsule 3     No current facility-administered medications for this visit.       Past Medical History:   Diagnosis Date    Panic disorder 02/01/2024     Past Surgical History:   Procedure Laterality Date    COLONOSCOPY      COLONOSCOPY N/A 6/20/2024    Procedure: COLONOSCOPY;  Surgeon: Lucho Luo MD;  Location: [facility] (4TH FLR);  Service: Endoscopy;  Laterality: N/A;  6/14/2024 ref Essence Gaitan NP, urgent double, PEG instr via portal- [facility]  6/15-pre call complete-tb  6/19-LVM for pt to come in earlier-[facility]    ESOPHAGOGASTRODUODENOSCOPY N/A 6/20/2024    Procedure: EGD (ESOPHAGOGASTRODUODENOSCOPY);  Surgeon: Lucho Luo MD;  [facility]  Service: Endoscopy;  Laterality: N/A;     Family History   Problem Relation Name Age of Onset    Cancer Mother Gaby Clay     Stroke Paternal Grandfather Abi Lalramanian     Learning disabilities Brother Serge Samayoa      Social History     Tobacco Use    Smoking status: Never     Passive exposure: Never    Smokeless tobacco: Never   Substance Use Topics    Alcohol use: Not Currently    Drug use: Never        Review of Systems:  Review of Systems    OBJECTIVE:     Vital Signs (Most Recent)              Physical Exam:  Physical Exam    Laboratory  CBC: Reviewed  CMP: Reviewed  Lipase: Reviewed  All ok    Diagnostic Results:  Mrcp reviewed, films viewed, 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  He has been working on gaining weight but hasn't been able to yet.  Sma/celiac stenosis is likely an over call.  S/p jaundice with ruq pain episode with continued ruq pain episodes with fatty foods.    PLAN:       He needs to work on gaining 10-20.  He will see a dietician and work on a liquid diet to gain weight.  Consider j tube over tpn if diet doesn't work.  If weight gain doesn't work I suggest sma bypass.  Consider ct to follow up on jejunal lesion.  Awaiting hepatology appointment.  I sent a note to Dr. Melendez about mesenteric stenosis and Robinson Goodman about choledochal cyst to get their input.

## 2024-07-26 ENCOUNTER — TELEPHONE (OUTPATIENT)
Dept: HEMATOLOGY/ONCOLOGY | Facility: CLINIC | Age: 29
End: 2024-07-26
Payer: MEDICAID

## 2024-07-26 DIAGNOSIS — K55.1 SMAS (SUPERIOR MESENTERIC ARTERY SYNDROME): Primary | ICD-10-CM

## 2024-07-26 NOTE — NURSING
Spoke with patient and scheduled appointment for 08/14/2024 with Dr. Robinson Goodman; Provided patient with appointment time and date, address of [facility] facility and direct line to navigator. All questions and concerns addressed.

## 2024-07-29 ENCOUNTER — TELEPHONE (OUTPATIENT)
Dept: FAMILY MEDICINE | Facility: CLINIC | Age: 29
End: 2024-07-29
Payer: MEDICAID

## 2024-07-29 NOTE — TELEPHONE ENCOUNTER
Called pt to reschedule appt on 8/1 due to  being out of the office. Appt has been scheduled with another provider

## 2024-08-01 ENCOUNTER — OFFICE VISIT (OUTPATIENT)
Dept: FAMILY MEDICINE | Facility: CLINIC | Age: 29
End: 2024-08-01
Payer: MEDICAID

## 2024-08-01 DIAGNOSIS — F90.2 ATTENTION DEFICIT HYPERACTIVITY DISORDER (ADHD), COMBINED TYPE: ICD-10-CM

## 2024-08-01 PROCEDURE — 99215 OFFICE O/P EST HI 40 MIN: CPT | Mod: 95,,, | Performed by: FAMILY MEDICINE

## 2024-08-01 PROCEDURE — 3044F HG A1C LEVEL LT 7.0%: CPT | Mod: CPTII,95,, | Performed by: FAMILY MEDICINE

## 2024-08-01 RX ORDER — DEXTROAMPHETAMINE SACCHARATE, AMPHETAMINE ASPARTATE MONOHYDRATE, DEXTROAMPHETAMINE SULFATE AND AMPHETAMINE SULFATE 3.75; 3.75; 3.75; 3.75 MG/1; MG/1; MG/1; MG/1
15 CAPSULE, EXTENDED RELEASE ORAL EVERY MORNING
Qty: 90 CAPSULE | Refills: 0 | Status: SHIPPED | OUTPATIENT
Start: 2024-08-22

## 2024-08-02 ENCOUNTER — PATIENT MESSAGE (OUTPATIENT)
Dept: FAMILY MEDICINE | Facility: CLINIC | Age: 29
End: 2024-08-02
Payer: MEDICAID

## 2024-08-02 NOTE — PROGRESS NOTES
The patient location is: [place]  The chief complaint leading to consultation is: ADHD  Visit type: audiovisual  Total time spent with patient: 5 mins  Each patient to whom he or she provides medical services by telemedicine is:  (1) informed of the relationship between the physician and patient and the respective role of any other health care provider with respect to management of the patient; and (2) notified that he or she may decline to receive medical services by telemedicine and may withdraw from such care at any time.      Subjective:       Patient ID: Joseph Torrez is a 28 y.o. male.    Chief Complaint: No chief complaint on file.      HPI  29 yo male presents for adhd f/u. Would like refills on his meds. States he would like to try straterra in the future but is okay to continue stimulants at this time.    Review of Systems   Constitutional: Negative.    HENT: Negative.     Respiratory: Negative.     Cardiovascular: Negative.    Gastrointestinal: Negative.    Endocrine: Negative.    Genitourinary: Negative.    Musculoskeletal: Negative.    Neurological: Negative.    Psychiatric/Behavioral: Negative.            Past Medical History:   Diagnosis Date    Panic disorder 02/01/2024     Past Surgical History:   Procedure Laterality Date    COLONOSCOPY      COLONOSCOPY N/A 6/20/2024    Procedure: COLONOSCOPY;  Surgeon: Lucho Luo MD;  Location: [facility] ([address] [place] [address]);  Service: Endoscopy;  Laterality: N/A;  6/14/2024 ref Essence Gaitan NP, urgent double, PEG instr via portal- [facility]  6/15-pre call complete-tb  6/19-LVM for pt to come in earlier-[facility]    ESOPHAGOGASTRODUODENOSCOPY N/A 6/20/2024    Procedure: EGD (ESOPHAGOGASTRODUODENOSCOPY);  Surgeon: Lucho Luo MD;  [facility]  Service: Endoscopy;  Laterality: N/A;     Family History   Problem Relation Name Age of Onset    Cancer Mother Gaby Clay     Stroke Paternal Grandfather Abi Rob     Learning disabilities Brother Serge  Danita      Social History     Socioeconomic History    Marital status: Single   Tobacco Use    Smoking status: Never     Passive exposure: Never    Smokeless tobacco: Never   Substance and Sexual Activity    Alcohol use: Not Currently    Drug use: Never    Sexual activity: Not Currently     Partners: Female     Birth control/protection: Condom     Social Determinants of Health     Financial Resource Strain: High Risk (1/31/2024)    Overall Financial Resource Strain (CARDIA)     Difficulty of Paying Living Expenses: Hard   Food Insecurity: Patient Declined (1/31/2024)    Hunger Vital Sign     Worried About Running Out of Food in the Last Year: Patient declined     Ran Out of Food in the Last Year: Patient declined   Transportation Needs: No Transportation Needs (1/31/2024)    PRAPARE - Transportation     Lack of Transportation (Medical): No     Lack of Transportation (Non-Medical): No   Physical Activity: Insufficiently Active (1/31/2024)    Exercise Vital Sign     Days of Exercise per Week: 3 days     Minutes of Exercise per Session: 40 min   Stress: No Stress Concern Present (1/31/2024)    [place] citizen [employer] [employer] - Occupational Stress Questionnaire     Feeling of Stress : Only a little   Housing Stability: Patient Declined (1/31/2024)    Housing Stability Vital Sign     Unable to Pay for Housing in the Last Year: Patient declined     Unstable Housing in the Last Year: Patient declined       Current Outpatient Medications:     cholecalciferol, vitD3,/vit K2 (VITAMIN D3-VITAMIN K2 ORAL), Take 1 capsule by mouth once daily., Disp: , Rfl:     [START ON 8/22/2024] dextroamphetamine-amphetamine (ADDERALL XR) 15 MG 24 hr capsule, Take 1 capsule (15 mg total) by mouth every morning., Disp: 90 capsule, Rfl: 0    ferrous sulfate 325 (65 FE) MG EC tablet, Take 325 mg by mouth once daily., Disp: , Rfl:     hydrocortisone 2.5 % cream, Apply topically 2 (two) times daily., Disp: 28 g, Rfl: 0    omega-3 fatty  acids/fish oil (FISH OIL-OMEGA-3 FATTY ACIDS) 300-1,000 mg capsule, Take 1 capsule by mouth once daily., Disp: , Rfl:     omeprazole (PRILOSEC) 40 MG capsule, Take 1 capsule (40 mg total) by mouth before breakfast. Best taken 45-60 minutes before your first protein meal of the day- Breakfast., Disp: 90 capsule, Rfl: 3   Objective:      There were no vitals filed for this visit.    Physical Exam  Constitutional:       General: He is not in acute distress.     Appearance: He is not diaphoretic.   HENT:      Head: Normocephalic and atraumatic.   Eyes:      Conjunctiva/sclera: Conjunctivae normal.   Pulmonary:      Effort: Pulmonary effort is normal.   Musculoskeletal:      Cervical back: Neck supple.   Skin:     Findings: No rash.   Neurological:      Mental Status: He is alert and oriented to person, place, and time.   Psychiatric:         Behavior: Behavior normal.         Thought Content: Thought content normal.         Judgment: Judgment normal.            Assessment:       1. Attention deficit hyperactivity disorder (ADHD), combined type        Plan:       Attention deficit hyperactivity disorder (ADHD), combined type  -     dextroamphetamine-amphetamine (ADDERALL XR) 15 MG 24 hr capsule; Take 1 capsule (15 mg total) by mouth every morning.  Dispense: 90 capsule; Refill: 0    Cont meds. Advised pt to f/u w/ his pcp next refill  Future Appointments   Date Time Provider Department Center   8/14/2024  8:30 AM LAB, [facility] LAB  [facility]   8/14/2024  9:00 AM Robinson Goodman MD [facility]   9/5/2024  9:00 AM Yoni Arthur NP [facility] HEPAT [facility]                   Patient note was created using [employer].  Any errors in syntax or even information may not have been identified and edited on initial review prior to signing this note.

## 2024-08-10 ENCOUNTER — PATIENT MESSAGE (OUTPATIENT)
Dept: DERMATOLOGY | Facility: CLINIC | Age: 29
End: 2024-08-10
Payer: MEDICAID

## 2024-08-10 DIAGNOSIS — L30.9 DERMATITIS, UNSPECIFIED: Primary | ICD-10-CM

## 2024-08-12 RX ORDER — BETAMETHASONE DIPROPIONATE 0.5 MG/G
OINTMENT TOPICAL
Qty: 45 G | Refills: 2 | Status: SHIPPED | OUTPATIENT
Start: 2024-08-12

## 2024-08-13 RX ORDER — MOMETASONE FUROATE 1 MG/G
OINTMENT TOPICAL
Qty: 60 G | Refills: 1 | Status: SHIPPED | OUTPATIENT
Start: 2024-08-13

## 2024-08-14 ENCOUNTER — LAB VISIT (OUTPATIENT)
Dept: LAB | Facility: HOSPITAL | Age: 29
End: 2024-08-14
Attending: SURGERY
Payer: MEDICAID

## 2024-08-14 ENCOUNTER — OFFICE VISIT (OUTPATIENT)
Dept: SURGERY | Facility: CLINIC | Age: 29
End: 2024-08-14
Payer: MEDICAID

## 2024-08-14 VITALS
SYSTOLIC BLOOD PRESSURE: 139 MMHG | BODY MASS INDEX: 15.89 KG/M2 | OXYGEN SATURATION: 99 % | DIASTOLIC BLOOD PRESSURE: 85 MMHG | HEART RATE: 113 BPM | WEIGHT: 107.56 LBS

## 2024-08-14 DIAGNOSIS — K55.1 SMAS (SUPERIOR MESENTERIC ARTERY SYNDROME): Primary | ICD-10-CM

## 2024-08-14 DIAGNOSIS — K55.1 SMAS (SUPERIOR MESENTERIC ARTERY SYNDROME): ICD-10-CM

## 2024-08-14 LAB
ALBUMIN SERPL BCP-MCNC: 4.1 G/DL (ref 3.5–5.2)
ALP SERPL-CCNC: 86 U/L (ref 55–135)
ALT SERPL W/O P-5'-P-CCNC: 19 U/L (ref 10–44)
ANION GAP SERPL CALC-SCNC: 10 MMOL/L (ref 8–16)
AST SERPL-CCNC: 17 U/L (ref 10–40)
BILIRUB DIRECT SERPL-MCNC: 0.6 MG/DL (ref 0.1–0.3)
BILIRUB SERPL-MCNC: 1.6 MG/DL (ref 0.1–1)
BUN SERPL-MCNC: 10 MG/DL (ref 6–20)
CALCIUM SERPL-MCNC: 9.6 MG/DL (ref 8.7–10.5)
CHLORIDE SERPL-SCNC: 106 MMOL/L (ref 95–110)
CO2 SERPL-SCNC: 27 MMOL/L (ref 23–29)
CREAT SERPL-MCNC: 1 MG/DL (ref 0.5–1.4)
EST. GFR  (NO RACE VARIABLE): >60 ML/MIN/1.73 M^2
GLUCOSE SERPL-MCNC: 82 MG/DL (ref 70–110)
POTASSIUM SERPL-SCNC: 4 MMOL/L (ref 3.5–5.1)
PROT SERPL-MCNC: 7.3 G/DL (ref 6–8.4)
SODIUM SERPL-SCNC: 143 MMOL/L (ref 136–145)

## 2024-08-14 PROCEDURE — 36415 COLL VENOUS BLD VENIPUNCTURE: CPT | Performed by: SURGERY

## 2024-08-14 PROCEDURE — 80053 COMPREHEN METABOLIC PANEL: CPT | Performed by: SURGERY

## 2024-08-14 PROCEDURE — 82248 BILIRUBIN DIRECT: CPT | Performed by: SURGERY

## 2024-08-14 PROCEDURE — 99213 OFFICE O/P EST LOW 20 MIN: CPT | Mod: PBBFAC | Performed by: SURGERY

## 2024-08-14 PROCEDURE — 99999 PR PBB SHADOW E&M-EST. PATIENT-LVL III: CPT | Mod: PBBFAC,,, | Performed by: SURGERY

## 2024-08-14 PROCEDURE — 99499 UNLISTED E&M SERVICE: CPT | Mod: S$PBB,,, | Performed by: SURGERY

## 2024-08-14 NOTE — PROGRESS NOTES
Joseph is a 29yo M with recent diagnosis of SMAS and seeing Dr. Kearns for this. He has a hx of jaundice first discovered when he was in [place]. He does not remember the actual value and it was in [place] units. This occurred around an URI. His most recent bilirubin is downtrending and is 1.6. On MRCP has a mildly dilated CBD.    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 Work up showed dilated CBD with no procedures. He left [place] and now in [place] and still losing weight. Currently has symptoms of diarrhea but attribute it to diet. No pain in RUQ. He endorses post-prandial RUQ pain with fatty foods. No recent jaundice episodes since episode in [place].       MRCP:  Impression:     1. Mild fusiform dilatation of the common bile duct as well as areas of fusiform dilatation in the central intrahepatic biliary ducts.  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  Correlation with ERCP is recommended.  2. Enhancement of the common bile duct as can be seen with cholangitis.  Clinical correlation is recommended.  3. Dilatation of the 2nd and 3rd portion of the duodenum to the level of the SMA, as can be seen with SMA syndrome.        On my review I do not appreciate any APBJ. He has a left portal system that comes off his right anterior in the liver.      A/P    Joseph has several issues, but I think most pressing is his weight loss and SMAS. He does have occasional RUQ pain and his CBD is mildly dilated. He has no substance abuse hx. I am hard pressed here to offer any intervention right now other than observation. His CBD is <8mm. I suspect his jaundice is reflective of an underlying conjugation disorder and he is set up to see hepatology. His RUQ US does not demonstrate gallstones. I certainly think further biliary workup is potentially in his future, but with so many confounding issues I  think its worthwhile sorting his SMAS first and then seeing what symptoms persist and directing his care based on this.    Lynne Goodman MD  Upper GI / Hepatobiliary Surgical Oncology  [facility] [place], [place]  Office: [phone]  Fax: [phone]

## 2024-08-15 ENCOUNTER — TELEPHONE (OUTPATIENT)
Dept: SURGERY | Facility: CLINIC | Age: 29
End: 2024-08-15
Payer: MEDICAID

## 2024-08-15 ENCOUNTER — DOCUMENTATION ONLY (OUTPATIENT)
Dept: SURGERY | Facility: CLINIC | Age: 29
End: 2024-08-15
Payer: MEDICAID

## 2024-08-15 NOTE — LETTER
August 16, 2024        Joseph Torrez  [address] [address]             [facility]  [address] [place] [id number]  Phone: [phone]   Patient: Joseph Torrez   MR Number: [id number]   YOB: 1995   Date of Visit: 8/15/2024     Dear Dr. Priscilla Terrell    Mr. Cheli Torrez was seen by Dr. Robinson Goodman at [facility] in [place] on August 14, 2024. Mr. Torrez is requesting for copies of his medical records to be faxed to our office. Please fax copies of notes from [place], his gastroenterologist and CT and ultrasound reports to [phone].    Please contact the office if you have any questions regarding the above request.    Sincerely,      EMILY HoldenN,RN  RN Navigator   Upper GI/Hepatobiliary Surgical Oncology  [facility] [address] [place] [address]  Office:   [phone]  Fax:        [phone]

## 2024-08-15 NOTE — TELEPHONE ENCOUNTER
Call placed to pt to provide introduction and contact information and review plan of care. Left message with call back number.

## 2024-08-15 NOTE — TELEPHONE ENCOUNTER
Received incoming call from pt. Introduction and plan of care reviewed. Pt reports he was seen at [facility] Dr. Priscilla Goetz (phone [phone] fax [phone]) and they will send over records once they receive request via fax. Pt reports [facility] should have copies of [place] records, CT and ultrasound report (approximately May -August) and notes from gastro provider. Pt was seen at [place] in 2023. Informed pt 1 year MRCP has been scheduled. Will schedule clinic appt once schedule is open. Pt verbalized understanding.

## 2024-08-16 NOTE — TELEPHONE ENCOUNTER
Request for records faxed to Dr. NOREEN Terrell. Successful fax transmission received. Fax confirmation scanned to media.

## 2024-08-19 ENCOUNTER — PATIENT MESSAGE (OUTPATIENT)
Dept: GASTROENTEROLOGY | Facility: CLINIC | Age: 29
End: 2024-08-19
Payer: MEDICAID

## 2024-08-23 ENCOUNTER — TELEPHONE (OUTPATIENT)
Dept: SURGERY | Facility: CLINIC | Age: 29
End: 2024-08-23
Payer: MEDICAID

## 2024-08-23 ENCOUNTER — PATIENT MESSAGE (OUTPATIENT)
Dept: FAMILY MEDICINE | Facility: CLINIC | Age: 29
End: 2024-08-23
Payer: MEDICAID

## 2024-08-23 NOTE — TELEPHONE ENCOUNTER
Call placed to Dr. Priscilla Goetz regarding request for medical records. No answer. Unable to leave a message.

## 2024-08-26 ENCOUNTER — PATIENT MESSAGE (OUTPATIENT)
Dept: SURGERY | Facility: CLINIC | Age: 29
End: 2024-08-26
Payer: MEDICAID

## 2024-08-26 ENCOUNTER — PATIENT MESSAGE (OUTPATIENT)
Dept: FAMILY MEDICINE | Facility: CLINIC | Age: 29
End: 2024-08-26
Payer: MEDICAID

## 2024-08-26 ENCOUNTER — TELEPHONE (OUTPATIENT)
Dept: SURGERY | Facility: CLINIC | Age: 29
End: 2024-08-26
Payer: MEDICAID

## 2024-08-26 NOTE — TELEPHONE ENCOUNTER
Call placed to Dr. Priscilla Goetz regarding request for medical records. No answer. Unable to leave a message. Automated message received office is closed.

## 2024-08-27 ENCOUNTER — TELEPHONE (OUTPATIENT)
Dept: SURGERY | Facility: CLINIC | Age: 29
End: 2024-08-27
Payer: MEDICAID

## 2024-08-27 NOTE — TELEPHONE ENCOUNTER
Call placed to pt regarding portal message for further clarification on what information is needed. Left message with call back number.

## 2024-08-27 NOTE — TELEPHONE ENCOUNTER
Returned call. Spoke to pt. Pt states Dr. Goodman signed note for pt for school to have extra testing time. Pt states his school is asking for Dr. Goodman's provider number to consider signature valid. Informed him this RN is not authorized to give him Dr. Goodman's provider number and suggested he contact the disability office. Offered phone number to disability office. Pt declined and requesting to send phone number via [facility] to disability office. Pt also informed this Rns' attempts at obtaining external records from office in [place] has been unsuccessful. Pt stated he will be traveling to [place] during his break and will stop at the clinic to obtain records as they previously gave him a difficult time as well. Will send pt phone number to disability office as requested. Pt verbalized understanding.

## 2024-08-27 NOTE — TELEPHONE ENCOUNTER
----- Message from Kelsy Conroy sent at 8/27/2024  2:28 PM CDT -----  Regarding: pt advice  Contact: [phone]  Patient calling to nurse regarding clarification that needed. Pls call

## 2024-08-27 NOTE — TELEPHONE ENCOUNTER
[facility] for patient to call me back at [phone] re:  Dr. Kearns would like to see patient for a f/u after his appointment with Hepatology in September

## 2024-08-27 NOTE — TELEPHONE ENCOUNTER
----- Message from Hany Kearns MD sent at 8/26/2024  5:05 PM CDT -----  Please schedule an appt with me after hepatology.  ----- Message -----  From: Ayaka Richter RN  Sent: 8/26/2024   8:14 AM CDT  To: Hany Kearsn MD    He currently doesn't have an appt with you, but he does have an appointment with hepatology for 9/5/24.  Would you like me to schedule an appointment with you after that or wait and see what that shows?    Thanks,  C  ----- Message -----  From: Hany Kearns MD  Sent: 8/25/2024   9:44 AM CDT  To: Robinson Goodman MD; #    Thanks.    Team, when are we seeing him again?  ----- Message -----  From: Robinson Goodman MD  Sent: 8/24/2024   5:34 PM CDT  To: Hany Kearns MD

## 2024-08-28 ENCOUNTER — TELEPHONE (OUTPATIENT)
Dept: SURGERY | Facility: CLINIC | Age: 29
End: 2024-08-28
Payer: MEDICAID

## 2024-08-28 NOTE — TELEPHONE ENCOUNTER
----- Message from Ayaka Richter RN sent at 8/28/2024 11:06 AM CDT -----  Regarding: FW: appt access  Contact: [phone]  I'll take care of it.  I just sent it to you for yesterday if you were looking for work.  Not an emergency.    Thanks anyway!  ----- Message -----  From: Megan Watson RN  Sent: 8/28/2024  10:49 AM CDT  To: Ayaka Richter RN  Subject: FW: appt access                                  Hey, I didn't get to this yet. I can still do it later but not sure if you needed this done asap.  ----- Message -----  From: Ayaka Richter RN  Sent: 8/27/2024   2:44 PM CDT  To: Megan Watson RN  Subject: FW: appt access                                    ----- Message -----  From: Kelsy Conroy  Sent: 8/27/2024   2:31 PM CDT  To: Som Santillan Staff  Subject: appt access                                      Patient calling to schedule follow up visit. Pls call

## 2024-08-28 NOTE — TELEPHONE ENCOUNTER
Spoke with patient  Appointment offered and accepted for Oct 3 at 2:45 pm  Pt is aware of location

## 2024-09-05 ENCOUNTER — LAB VISIT (OUTPATIENT)
Dept: LAB | Facility: HOSPITAL | Age: 29
End: 2024-09-05
Payer: MEDICAID

## 2024-09-05 ENCOUNTER — TELEPHONE (OUTPATIENT)
Dept: HEPATOLOGY | Facility: CLINIC | Age: 29
End: 2024-09-05

## 2024-09-05 ENCOUNTER — OFFICE VISIT (OUTPATIENT)
Dept: HEPATOLOGY | Facility: CLINIC | Age: 29
End: 2024-09-05
Payer: MEDICAID

## 2024-09-05 VITALS — WEIGHT: 109.38 LBS | BODY MASS INDEX: 16.2 KG/M2 | HEIGHT: 69 IN

## 2024-09-05 DIAGNOSIS — R17 ELEVATED BILIRUBIN: Primary | ICD-10-CM

## 2024-09-05 DIAGNOSIS — R10.11 RUQ PAIN: ICD-10-CM

## 2024-09-05 DIAGNOSIS — R17 ELEVATED BILIRUBIN: ICD-10-CM

## 2024-09-05 PROCEDURE — 81350 UGT1A1 GENE COMMON VARIANTS: CPT

## 2024-09-05 PROCEDURE — 36415 COLL VENOUS BLD VENIPUNCTURE: CPT

## 2024-09-05 PROCEDURE — 99213 OFFICE O/P EST LOW 20 MIN: CPT | Mod: PBBFAC

## 2024-09-05 PROCEDURE — 99999 PR PBB SHADOW E&M-EST. PATIENT-LVL III: CPT | Mod: PBBFAC,,,

## 2024-09-05 NOTE — PROGRESS NOTES
[facility] - New Patient    REFERRING PROVIDER:  Nicole Gage  PCP: Barbara Maya MD     Chief Complaint:  elevated bilirubin     HISTORY       HPI: This is a 28 y.o. patient with PMH noted below, presenting for evaluation of elevated bilirubin.      No previous serologic w/u.      Interval HPI: Presents today alone. Reports that he has been struggling with RUQ pain for a year now, started when he was in [place] and has gotten better but still present. Reports that his RUQ pain comes and goes and he has found that it is exacerbated by fatty & high protein foods. Does report that he experienced some jaundice when in [place], but was also ill with an URI during that time. Suffers with nausea constantly and tries to eat frequent small portions to maintain his weight. He is attempting to have surgery for SMAS in the near future. Has an EGD scheduled this month to follow up on gastritis and esophagitis.       LABS & DIAGNOSTIC STUDIES        Labs done 8/2024 show Tbili elevated, indirect > direct,   AST, ALT WNL, alk phos WNL, platelets WNL, Albumin WNL,     Lab Results   Component Value Date    ALT 19 08/14/2024    AST 17 08/14/2024    ALKPHOS 86 08/14/2024    BILITOT 1.6 (H) 08/14/2024    ALBUMIN 4.1 08/14/2024     06/07/2024       Imaging:  MRI/MRCP done 7/2024 noted:  FINDINGS:  Inferior Thorax: Unremarkable.     Liver: Normal size and background parenchymal signal.  No focal lesion.  Hepatic and portal veins are patent.  Variant portal venous anatomy with absent left extrahepatic portal vein.     Gallbladder: Nondistended.  No filling defects to suggest cholelithiasis.     Bile Ducts: Fusiform dilatation of the common bile duct measuring up to 8 mm.  Focal areas of mild fusiform dilatation of the intrahepatic central left and right bile ducts.  Enhancement of the common bile duct wall noted on postcontrast sequences.  No filling defects identified.     Pancreas: No mass or ductal  "dilatation.     Spleen: Normal in size.     Adrenals: No focal lesions.     Kidneys/Ureters: No mass or hydronephrosis.     GI Tract/Mesentery: Dilatation of the 2nd and 3rd portions of the duodenum with decompression as it passes between the SMA and aorta.     Peritoneal Space: No ascites.     Retroperitoneum: No pathologically enlarged nodes.     Abdominal wall: Unremarkable.     Vasculature: Abdominal aorta is normal in caliber.     Bones: No marrow replacing lesions.     Impression:     1. Mild fusiform dilatation of the common bile duct as well as areas of fusiform dilatation in the central intrahepatic biliary ducts.  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  Correlation with ERCP is recommended.  2. Enhancement of the common bile duct as can be seen with cholangitis.  Clinical correlation is recommended.  3. Dilatation of the 2nd and 3rd portion of the duodenum to the level of the SMA, as can be seen with SMA syndrome.  4. Additional findings as described.      Liver fibrosis staging:  -- fibroscan - will obtain      Denies family history of liver disease, Gilbert's disease.    Denies alcohol consumption currently or in the past.            Allergy and medication list reviewed and updated     PMHX:  has a past medical history of Panic disorder (02/01/2024).    PSHX:  has a past surgical history that includes Colonoscopy; Esophagogastroduodenoscopy (N/A, 6/20/2024); and Colonoscopy (N/A, 6/20/2024).    FAMILY HISTORY: Updated and reviewed in EPIC    ROS:   GENERAL: Denies fatigue  CARDIOVASCULAR: Denies edema  GI: Denies abdominal pain  SKIN: Denies rash, itching   NEURO: Denies confusion, memory loss, or mood changes    PHYSICAL EXAM:   In no acute distress; alert and oriented to person, place and time  VITALS: Ht 5' 9" (1.753 m)   Wt 49.6 kg (109 lb 5.6 oz)   BMI 16.15 kg/m²   EYES: Sclerae anicteric  GI: Soft, non-tender, non-distended. " No ascites.  EXTREMITIES:  No edema.  SKIN: Warm and dry. No jaundice. No telangectasias noted. No palmar erythema.  NEURO:  No asterixis.  PSYCH:  Thought and speech pattern appropriate. Behavior normal        ASSESSMENT & PLAN        EDUCATION:  See instructions discussed with patient in Instructions section of the After Visit Summary     ASSESSMENT & PLAN:  28 y.o. male with:  1. Elevated bilirubin   -- UGT1A1 to rule out Gilbert's  -- T bili elevated (range 1.6 to 2.7) since 2/2024  -- indirect > direct   -- albumin WNL  -- MRI/MRCP with fusiform dilation of the common bile duct & intrahepatic biliary ducts  -- fibroscan soon  -- labs today  -- will review imaging from MRI/MRCP to see if EUS/ERCP warranted            Follow up for pending workup. with labs and fibroscan before  Orders Placed This Encounter   Procedures    FibroScan Transplant Hepatology(Vibration Controlled Transient Elastography)    UGT1A1 Genotype        Thank you for allowing me to participate in the care of Joseph Torrez    BAKARI Meyer, FNP-C  Nurse Practitioner  [facility]  [facility]     I spent a total of 45 minutes on the day of the visit.This includes face to face time and non-face to face time preparing to see the patient (eg, review of tests), obtaining and/or reviewing separately obtained history, documenting clinical information in the electronic or other health record, independently interpreting results and communicating results to the patient/family/caregiver, and coordinating care.         CC'ed note to:   Nicole Gage, DIANNEC  Barbara Maya MD

## 2024-09-05 NOTE — TELEPHONE ENCOUNTER
I discussed this patient's case with Dr. Ivan and their pertinent findings during our weekly Patient Care Conference. I presented their medical history, relevant labs, and imaging to the physician. We discussed their plan of care and current assessment.    Recommended to continue with plan for UGT1A1 tor ule out Gilbert's and fibroscan to rule out fibrosis. If Gilbert's confirmed and no fibrosis on fibroscan no further hepatology workup needed. Can continue with hepatobiliary plan per Dr. Goodman.

## 2024-09-06 ENCOUNTER — PROCEDURE VISIT (OUTPATIENT)
Dept: HEPATOLOGY | Facility: CLINIC | Age: 29
End: 2024-09-06
Payer: MEDICAID

## 2024-09-06 DIAGNOSIS — R17 ELEVATED BILIRUBIN: ICD-10-CM

## 2024-09-06 PROCEDURE — 91200 LIVER ELASTOGRAPHY: CPT | Mod: 26,S$PBB,,

## 2024-09-06 PROCEDURE — 91200 LIVER ELASTOGRAPHY: CPT | Mod: PBBFAC

## 2024-09-09 ENCOUNTER — PATIENT MESSAGE (OUTPATIENT)
Dept: HEPATOLOGY | Facility: CLINIC | Age: 29
End: 2024-09-09
Payer: MEDICAID

## 2024-09-09 LAB
ANNOTATION COMMENT IMP: ABNORMAL
GENETICIST REVIEW: ABNORMAL
PROVIDER SIGNING NAME: ABNORMAL
TEST PERFORMANCE INFO SPEC: ABNORMAL
UGT1A1 ALLELE GENO BLD/T: ABNORMAL
UGT1A1 GENE PROD MET ACT IMP BLD/T-IMP: ABNORMAL
UGT1A1 METHOD: ABNORMAL

## 2024-09-09 NOTE — PROCEDURES
FibroScan Transplant Hepatology(Vibration Controlled Transient Elastography)    Date/Time: 9/6/2024 11:15 AM    Performed by: Yoni Arthur NP  Authorized by: Yoni Arthur NP    Diagnosis:  Other    Probe:  M    Universal Protocol: Patient's identity, procedure and site were verified, confirmatory pause was performed.  Discussed procedure including risks and potential complications.  Questions answered.  Patient verbalizes understanding and wishes to proceed with VCTE.     Procedure: After providing explanations of the procedure, patient was placed in the supine position with right arm in maximum abduction to allow optimal exposure of right lateral abdomen.  Patient was briefly assessed, Testing was performed in the mid-axillary location, 50Hz Shear Wave pulses were applied and the resulting Shear Wave and Propagation Speed detected with a 3.5 MHz ultrasonic signal, using the FibroScan probe, Skin to liver capsule distance and liver parenchyma were accessed during the entire examination with the FibroScan probe, Patient was instructed to breathe normally and to abstain from sudden movements during the procedure, allowing for random measurements of liver stiffness. At least 10 Shear Waves were produced, Individual measurements of each Shear Wave were calculated.  Patient tolerated the procedure well with no complications.  Meets discharge criteria as was dismissed.  Rates pain 0 out of 10.  Patient will follow up with ordering provider to review results.    Findings  Median liver stiffness score:  5.4  CAP Reading: dB/m:  148    IQR/med %:  5  Interpretation  Fibrosis interpretation is based on medial liver stiffness - Kilopascal (kPa).    Fibrosis Stage:  F 0-1  Steatosis interpretation is based on controlled attenuation parameter - (dB/m).    Steatosis Grade:  <S1

## 2024-09-10 DIAGNOSIS — E80.4 GILBERT'S DISEASE: Primary | ICD-10-CM

## 2024-09-17 ENCOUNTER — TELEPHONE (OUTPATIENT)
Dept: ENDOSCOPY | Facility: HOSPITAL | Age: 29
End: 2024-09-17
Payer: MEDICAID

## 2024-09-17 NOTE — TELEPHONE ENCOUNTER
Spoke to patient to reschedule procedure(s) Colonoscopy/EGD       Physician to perform procedure(s) Dr. NOREEN Luo  Date of Procedure (s) 9/26/24  Arrival Time 12:45 PM  Time of Procedure(s) 1:45 PM   Location of Procedure(s) [place] [address] Floor  Type of Rx Prep sent to patient: Miralax extended  Instructions provided to [facility] via [facility]    Patient was informed on the following information and verbalized understanding. Screening questionnaire reviewed with patient and complete. If procedure requires anesthesia, a responsible adult needs to be present to accompany the patient home, patient cannot drive after receiving anesthesia. Appointment details are tentative, especially check-in time. Patient will receive a prep-op call 7 days prior to confirm check-in time for procedure. If applicable the patient should contact their pharmacy to verify Rx for procedure prep is ready for pick-up. Patient was advised to call the scheduling department at [phone] if pharmacy states no Rx is available. Patient was advised to call the endoscopy scheduling department if any questions or concerns arise.      [facility] Scheduling Department

## 2024-09-20 ENCOUNTER — TELEPHONE (OUTPATIENT)
Dept: ENDOSCOPY | Facility: HOSPITAL | Age: 29
End: 2024-09-20
Payer: MEDICAID

## 2024-09-20 ENCOUNTER — PATIENT MESSAGE (OUTPATIENT)
Dept: ENDOSCOPY | Facility: HOSPITAL | Age: 29
End: 2024-09-20
Payer: MEDICAID

## 2024-09-20 NOTE — TELEPHONE ENCOUNTER
Contacted Pt for Endoscopy precall to confirm scheduled procedure(s) Colonoscopy/EGD on 9/26/24. Pt did not answer. Left voicemail for pt to call Endoscopy Scheduling to confirm.

## 2024-09-23 ENCOUNTER — PATIENT MESSAGE (OUTPATIENT)
Dept: SURGERY | Facility: CLINIC | Age: 29
End: 2024-09-23
Payer: MEDICAID

## 2024-09-23 ENCOUNTER — TELEPHONE (OUTPATIENT)
Dept: ENDOSCOPY | Facility: HOSPITAL | Age: 29
End: 2024-09-23
Payer: MEDICAID

## 2024-09-23 NOTE — TELEPHONE ENCOUNTER
Patient is calling to get prep instruction prep instruction was Review Patient verbalized understanding

## 2024-09-26 ENCOUNTER — HOSPITAL ENCOUNTER (OUTPATIENT)
Facility: HOSPITAL | Age: 29
Discharge: HOME | End: 2024-09-26
Attending: INTERNAL MEDICINE | Admitting: INTERNAL MEDICINE
Payer: MEDICAID

## 2024-09-26 ENCOUNTER — ANESTHESIA (OUTPATIENT)
Dept: ENDOSCOPY | Facility: HOSPITAL | Age: 29
End: 2024-09-26
Payer: MEDICAID

## 2024-09-26 ENCOUNTER — PATIENT MESSAGE (OUTPATIENT)
Dept: ENDOSCOPY | Facility: HOSPITAL | Age: 29
End: 2024-09-26
Payer: MEDICAID

## 2024-09-26 ENCOUNTER — ANESTHESIA EVENT (OUTPATIENT)
Dept: ENDOSCOPY | Facility: HOSPITAL | Age: 29
End: 2024-09-26
Payer: MEDICAID

## 2024-09-26 VITALS
RESPIRATION RATE: 14 BRPM | HEART RATE: 61 BPM | HEIGHT: 69 IN | DIASTOLIC BLOOD PRESSURE: 78 MMHG | OXYGEN SATURATION: 99 % | BODY MASS INDEX: 16 KG/M2 | TEMPERATURE: 98 F | SYSTOLIC BLOOD PRESSURE: 118 MMHG | WEIGHT: 108 LBS

## 2024-09-26 DIAGNOSIS — R10.9 ABDOMINAL PAIN: ICD-10-CM

## 2024-09-26 PROCEDURE — 43239 EGD BIOPSY SINGLE/MULTIPLE: CPT | Performed by: INTERNAL MEDICINE

## 2024-09-26 PROCEDURE — 25000003 PHARM REV CODE 250

## 2024-09-26 PROCEDURE — 63600175 PHARM REV CODE 636 W HCPCS

## 2024-09-26 PROCEDURE — 88305 TISSUE EXAM BY PATHOLOGIST: CPT | Performed by: PATHOLOGY

## 2024-09-26 PROCEDURE — 45380 COLONOSCOPY AND BIOPSY: CPT | Mod: ,,, | Performed by: INTERNAL MEDICINE

## 2024-09-26 PROCEDURE — 45380 COLONOSCOPY AND BIOPSY: CPT | Performed by: INTERNAL MEDICINE

## 2024-09-26 PROCEDURE — 88342 IMHCHEM/IMCYTCHM 1ST ANTB: CPT | Performed by: PATHOLOGY

## 2024-09-26 PROCEDURE — 27201012 HC FORCEPS, HOT/COLD, DISP: Performed by: INTERNAL MEDICINE

## 2024-09-26 PROCEDURE — 37000009 HC ANESTHESIA EA ADD 15 MINS: Performed by: INTERNAL MEDICINE

## 2024-09-26 PROCEDURE — 37000008 HC ANESTHESIA 1ST 15 MINUTES: Performed by: INTERNAL MEDICINE

## 2024-09-26 PROCEDURE — 88341 IMHCHEM/IMCYTCHM EA ADD ANTB: CPT | Mod: 26,,, | Performed by: PATHOLOGY

## 2024-09-26 PROCEDURE — 43239 EGD BIOPSY SINGLE/MULTIPLE: CPT | Mod: 51,,, | Performed by: INTERNAL MEDICINE

## 2024-09-26 PROCEDURE — 88305 TISSUE EXAM BY PATHOLOGIST: CPT | Mod: 26,,, | Performed by: PATHOLOGY

## 2024-09-26 PROCEDURE — 88342 IMHCHEM/IMCYTCHM 1ST ANTB: CPT | Mod: 26,,, | Performed by: PATHOLOGY

## 2024-09-26 PROCEDURE — 88341 IMHCHEM/IMCYTCHM EA ADD ANTB: CPT | Performed by: PATHOLOGY

## 2024-09-26 RX ORDER — PROPOFOL 10 MG/ML
VIAL (ML) INTRAVENOUS
Status: DISCONTINUED | OUTPATIENT
Start: 2024-09-26 | End: 2024-09-26

## 2024-09-26 RX ORDER — PROPOFOL 10 MG/ML
VIAL (ML) INTRAVENOUS CONTINUOUS PRN
Status: DISCONTINUED | OUTPATIENT
Start: 2024-09-26 | End: 2024-09-26

## 2024-09-26 RX ORDER — SODIUM CHLORIDE 9 MG/ML
INJECTION, SOLUTION INTRAVENOUS CONTINUOUS
Status: DISCONTINUED | OUTPATIENT
Start: 2024-09-26 | End: 2024-09-26 | Stop reason: HOSPADM

## 2024-09-26 RX ORDER — LIDOCAINE HYDROCHLORIDE 20 MG/ML
INJECTION INTRAVENOUS
Status: DISCONTINUED | OUTPATIENT
Start: 2024-09-26 | End: 2024-09-26

## 2024-09-26 RX ADMIN — PROPOFOL 100 MG: 10 INJECTION, EMULSION INTRAVENOUS at 02:09

## 2024-09-26 RX ADMIN — LIDOCAINE HYDROCHLORIDE 75 MG: 20 INJECTION INTRAVENOUS at 02:09

## 2024-09-26 RX ADMIN — SODIUM CHLORIDE: 9 INJECTION, SOLUTION INTRAVENOUS at 02:09

## 2024-09-26 RX ADMIN — PROPOFOL 175 MCG/KG/MIN: 10 INJECTION, EMULSION INTRAVENOUS at 02:09

## 2024-09-26 RX ADMIN — PROPOFOL 30 MG: 10 INJECTION, EMULSION INTRAVENOUS at 02:09

## 2024-09-26 NOTE — ANESTHESIA PREPROCEDURE EVALUATION
[facility]  Anesthesia Pre-Operative Evaluation         Patient Name/: Joseph Torrez, 1995  MRN: [id number]    SUBJECTIVE:     Pre-operative evaluation for Procedure(s) (LRB):  EGD (ESOPHAGOGASTRODUODENOSCOPY) (N/A)  COLONOSCOPY (N/A)     2024    Joseph Torrez is a 28 y.o. male w/ a significant PMHx of ADHD, GERD, IBS, and esophageal dysphagia.    Patient now presents for the above procedure(s).    ________________________________________  No results found for this or any previous visit.    ________________________________________    LDA:        Drips:       Patient Active Problem List   Diagnosis    Attention deficit hyperactivity disorder (ADHD), combined type    Irritable bowel syndrome with both constipation and diarrhea    Epigastric pain    History of psoriasis    Weight loss    Need for HPV vaccination    RUQ pain    Esophageal dysphagia    Gastroesophageal reflux disease without esophagitis    Body mass index (BMI) less than 19 in adult    SMAS (superior mesenteric artery syndrome)    Elevated bilirubin       Review of patient's allergies indicates:  No Known Allergies    Current Inpatient Medications:       No current facility-administered medications on file prior to encounter.     Current Outpatient Medications on File Prior to Encounter   Medication Sig Dispense Refill    cholecalciferol, vitD3,/vit K2 (VITAMIN D3-VITAMIN K2 ORAL) Take 1 capsule by mouth once daily.      ferrous sulfate 325 (65 FE) MG EC tablet Take 325 mg by mouth once daily.      hydrocortisone 2.5 % cream Apply topically 2 (two) times daily. 28 g 0    omega-3 fatty acids/fish oil (FISH OIL-OMEGA-3 FATTY ACIDS) 300-1,000 mg capsule Take 1 capsule by mouth once daily.      omeprazole (PRILOSEC) 40 MG capsule Take 1 capsule (40 mg total) by mouth before breakfast. Best taken 45-60 minutes before your first protein meal of the day- Breakfast. 90 capsule 3       Past Surgical History:   Procedure Laterality  Date    COLONOSCOPY      COLONOSCOPY N/A 6/20/2024    Procedure: COLONOSCOPY;  Surgeon: Lucho Luo MD;  Location: [facility] ([address] [place] [address]);  Service: Endoscopy;  Laterality: N/A;  6/14/2024 ref Essence Gaitan, NP, urgent double, PEG instr via portal- [facility]  6/15-pre call complete-tb  6/19-LVM for pt to come in earlier-Kpvt    ESOPHAGOGASTRODUODENOSCOPY N/A 6/20/2024    Procedure: EGD (ESOPHAGOGASTRODUODENOSCOPY);  Surgeon: Lucho Luo MD;  Location: [address]);  Service: Endoscopy;  Laterality: N/A;       Social History:  Tobacco Use: Low Risk  (9/9/2024)    Patient History     Smoking Tobacco Use: Never     Smokeless Tobacco Use: Never     Passive Exposure: Never       Alcohol Use: Not At Risk (1/31/2024)    AUDIT-C     Frequency of Alcohol Consumption: Never     Average Number of Drinks: Patient does not drink     Frequency of Binge Drinking: Never       OBJECTIVE:     Vital Signs Range:  BMI Readings from Last 1 Encounters:   09/05/24 16.15 kg/m²               Significant Labs:        Component Value Date/Time    WBC 5.90 06/07/2024 0754    HGB 16.1 06/07/2024 0754    HCT 46.8 06/07/2024 0754     06/07/2024 0754     08/14/2024 0850    K 4.0 08/14/2024 0850     08/14/2024 0850    CO2 27 08/14/2024 0850    GLU 82 08/14/2024 0850    BUN 10 08/14/2024 0850    CREATININE 1.0 08/14/2024 0850    CALCIUM 9.6 08/14/2024 0850    ALBUMIN 4.1 08/14/2024 0850    PROT 7.3 08/14/2024 0850    ALKPHOS 86 08/14/2024 0850    BILITOT 1.6 (H) 08/14/2024 0850    AST 17 08/14/2024 0850    ALT 19 08/14/2024 0850    HGBA1C 4.6 02/15/2024 0735        Please see Results Review for additional labs.     Diagnostic Studies: No relevant studies.    EKG:   No results found for this or any previous visit.    ECHO:  See subjective, if available.      ASSESSMENT/PLAN:                                                                                                                  09/26/2024  Joseph Torrez is a 28  y.o., male.      Pre-op Assessment    I have reviewed the Patient Summary Reports.     I have reviewed the Nursing Notes.    I have reviewed the Medications.     Review of Systems  Anesthesia Hx:  No problems with previous Anesthesia   History of prior surgery of interest to airway management or planning:          Denies Family Hx of Anesthesia complications.    Denies Personal Hx of Anesthesia complications.                    Hematology/Oncology:    Oncology Normal                                   Cardiovascular:      Denies Hypertension.       Denies Angina.        ECG has been reviewed.                          Pulmonary:       Denies Shortness of breath.  Denies Recent URI.                 Renal/:  Renal/ Normal                 Hepatic/GI:     GERD Denies Liver Disease.  Esophagitis, gastritis, esophageal dysmotility, SMA syndrome          Neurological:    Denies CVA.    Denies Seizures.                                Endocrine:  Endocrine Normal Denies Diabetes.           Psych:  Psychiatric History (ADHD)                  Physical Exam  General: Well nourished, Cooperative, Alert and Oriented    Airway:  Mallampati: I / I  Mouth Opening: Normal  TM Distance: Normal  Tongue: Normal  Neck ROM: Normal ROM    Dental:  Intact        Anesthesia Plan  Type of Anesthesia, risks & benefits discussed:    Anesthesia Type: Gen Natural Airway, MAC  Intra-op Monitoring Plan: Standard ASA Monitors  Post Op Pain Control Plan: multimodal analgesia and IV/PO Opioids PRN  Induction:  IV  Informed Consent: Informed consent signed with the Patient and all parties understand the risks and agree with anesthesia plan.  All questions answered.   ASA Score: 2  Day of Surgery Review of History & Physical: H&P Update referred to the surgeon/provider.    Ready For Surgery From Anesthesia Perspective.     .

## 2024-09-26 NOTE — ANESTHESIA POSTPROCEDURE EVALUATION
Anesthesia Post Evaluation    Patient: Joseph Torrez    Procedure(s) Performed: Procedure(s) (LRB):  EGD (ESOPHAGOGASTRODUODENOSCOPY) (N/A)  COLONOSCOPY (N/A)    Final Anesthesia Type: general      Patient location during evaluation: GI PACU  Patient participation: Yes- Able to Participate  Level of consciousness: awake and alert  Post-procedure vital signs: reviewed and stable  Pain management: adequate  Airway patency: patent  NEFTALI mitigation strategies: Multimodal analgesia  PONV status at discharge: No PONV  Anesthetic complications: no      Cardiovascular status: blood pressure returned to baseline, hemodynamically stable and stable  Respiratory status: unassisted, room air and spontaneous ventilation  Hydration status: euvolemic  Follow-up not needed.              Vitals Value Taken Time   /82 09/26/24 1530   Temp 36.4 °C (97.5 °F) 09/26/24 1515   Pulse 71 09/26/24 1530   Resp 15 09/26/24 1530   SpO2 100 % 09/26/24 1530         No case tracking events are documented in the log.      Pain/Angel Score: Angel Score: 10 (9/26/2024  3:33 PM)

## 2024-09-26 NOTE — [employer]ATION PATIENT INSTRUCTIONS
Discharge Summary/Instructions after an Endoscopic Procedure  Patient Name: Joseph Torrez  Patient MRN: [id number]  Patient YOB: 1995  Thursday, September 26, 2024  Lucho Luo MD  Dear patient,  As a result of recent federal legislation (The Federal Cures Act), you may   receive lab or pathology results from your procedure in your [facility]   account before your physician is able to contact you. Your physician or   their representative will relay the results to you with their   recommendations at their soonest availability.  Thank you,  RESTRICTIONS:  During your procedure today, you received medications for sedation.  These   medications may affect your judgment, balance and coordination.  Therefore,   for 24 hours, you have the following restrictions:   - DO NOT drive a car, operate machinery, make legal/financial decisions,   sign important papers or drink alcohol.    ACTIVITY:  Today: no heavy lifting, straining or running due to procedural   sedation/anesthesia.  The following day: return to full activity including work.  DIET:  Eat and drink normally unless instructed otherwise.     TREATMENT FOR COMMON SIDE EFFECTS:  - Mild abdominal pain, nausea, belching, bloating or excessive gas:  rest,   eat lightly and use a heating pad.  - Sore Throat: treat with throat lozenges and/or gargle with warm salt   water.  - Because air was used during the procedure, expelling large amounts of air   from your rectum or belching is normal.  - If a bowel prep was taken, you may not have a bowel movement for 1-3 days.    This is normal.  SYMPTOMS TO WATCH FOR AND REPORT TO YOUR PHYSICIAN:  1. Abdominal pain or bloating, other than gas cramps.  2. Chest pain.  3. Back pain.  4. Signs of infection such as: chills or fever occurring within 24 hours   after the procedure.  5. Rectal bleeding, which would show as bright red, maroon, or black stools.   (A tablespoon of blood from the rectum is not serious, especially  if   hemorrhoids are present.)  6. Vomiting.  7. Weakness or dizziness.  GO DIRECTLY TO THE NEAREST EMERGENCY ROOM IF YOU HAVE ANY OF THE FOLLOWING:      Difficulty breathing              Chills and/or fever over 101 F   Persistent vomiting and/or vomiting blood   Severe abdominal pain   Severe chest pain   Black, tarry stools   Bleeding- more than one tablespoon   Any other symptom or condition that you feel may need urgent attention  Your doctor recommends these additional instructions:  If any biopsies were taken, your doctors clinic will contact you in 1 to 2   weeks with any results.  - Discharge patient to home.   - Follow an antireflux regimen indefinitely.   - Await pathology results.   - Telephone nurse practitioner for pathology results in 2 weeks.   - Use Omeprazole 40 mg once daily (or any other full strength proton pump   inhibitor) - best taken 45 minutes before your first protein containing   meal.   - Repeat upper endoscopy in 3 years for surveillance based on pathology   results.   - The findings and recommendations were discussed with the patient.   - Refer to a dietitian at the next available appointment. Help with weight   gain.  - Return to nurse practitioner at the next available appointment.   - Consider avoiding all non-steroidal anti-inflammatory drugs (aspirin,   ibuprofen, naproxen, etc.), unless needed for cardiovascular protection.    Recommend you discuss with your prescribing doctor (of your aspirin) to see   if cardiovascular benefits of your aspirin outweigh the risks of GI   bleeding.  - The findings and recommendations were discussed with the patient.  For questions, problems or results please call your physician - Lucho Luo MD at Work:  [phone].  [facility], EMERGENCY ROOM PHONE NUMBER: [phone]  IF A COMPLICATION OR EMERGENCY SITUATION ARISES AND YOU ARE UNABLE TO REACH   YOUR PHYSICIAN - GO DIRECTLY TO THE EMERGENCY ROOM.  Lucho Luo  MD  9/26/2024 3:42:36 PM  This report has been verified and signed electronically.  Dear patient,  As a result of recent federal legislation (The Federal Cures Act), you may   receive lab or pathology results from your procedure in your [facility]   account before your physician is able to contact you. Your physician or   their representative will relay the results to you with their   recommendations at their soonest availability.  Thank you,  [employer]ATION

## 2024-09-26 NOTE — PROVATION PATIENT INSTRUCTIONS
Discharge Summary/Instructions after an Endoscopic Procedure  Patient Name: Joseph Torrez  Patient MRN: [id number]  Patient YOB: 1995  Thursday, September 26, 2024  Lucho Luo MD  Dear patient,  As a result of recent federal legislation (The Federal Cures Act), you may   receive lab or pathology results from your procedure in your [facility]   account before your physician is able to contact you. Your physician or   their representative will relay the results to you with their   recommendations at their soonest availability.  Thank you,  RESTRICTIONS:  During your procedure today, you received medications for sedation.  These   medications may affect your judgment, balance and coordination.  Therefore,   for 24 hours, you have the following restrictions:   - DO NOT drive a car, operate machinery, make legal/financial decisions,   sign important papers or drink alcohol.    ACTIVITY:  Today: no heavy lifting, straining or running due to procedural   sedation/anesthesia.  The following day: return to full activity including work.  DIET:  Eat and drink normally unless instructed otherwise.     TREATMENT FOR COMMON SIDE EFFECTS:  - Mild abdominal pain, nausea, belching, bloating or excessive gas:  rest,   eat lightly and use a heating pad.  - Sore Throat: treat with throat lozenges and/or gargle with warm salt   water.  - Because air was used during the procedure, expelling large amounts of air   from your rectum or belching is normal.  - If a bowel prep was taken, you may not have a bowel movement for 1-3 days.    This is normal.  SYMPTOMS TO WATCH FOR AND REPORT TO YOUR PHYSICIAN:  1. Abdominal pain or bloating, other than gas cramps.  2. Chest pain.  3. Back pain.  4. Signs of infection such as: chills or fever occurring within 24 hours   after the procedure.  5. Rectal bleeding, which would show as bright red, maroon, or black stools.   (A tablespoon of blood from the rectum is not serious, especially  if   hemorrhoids are present.)  6. Vomiting.  7. Weakness or dizziness.  GO DIRECTLY TO THE NEAREST EMERGENCY ROOM IF YOU HAVE ANY OF THE FOLLOWING:      Difficulty breathing              Chills and/or fever over 101 F   Persistent vomiting and/or vomiting blood   Severe abdominal pain   Severe chest pain   Black, tarry stools   Bleeding- more than one tablespoon   Any other symptom or condition that you feel may need urgent attention  Your doctor recommends these additional instructions:  If any biopsies were taken, your doctors clinic will contact you in 1 to 2   weeks with any results.  - Discharge patient to home.   - Await pathology results.   - Telephone nurse practitioner for pathology results in 2 weeks.   - Repeat colonoscopy (date not yet determined).   - Return to nurse practitioner.   - Refer to a dietitian at the next available appointment.   - Consider avoiding all non-steroidal anti-inflammatory drugs (aspirin,   ibuprofen, naproxen, etc.), unless needed for cardiovascular protection.    Recommend you discuss with your prescribing doctor (of your aspirin) to see   if cardiovascular benefits of your aspirin outweigh the risks of GI   bleeding.  - The findings and recommendations were discussed with the patient.  For questions, problems or results please call your physician - Lucho Luo MD at Work:  [phone].  [facility], EMERGENCY ROOM PHONE NUMBER: [phone]  IF A COMPLICATION OR EMERGENCY SITUATION ARISES AND YOU ARE UNABLE TO REACH   YOUR PHYSICIAN - GO DIRECTLY TO THE EMERGENCY ROOM.  Lucho Luo MD  9/26/2024 3:30:10 PM  This report has been verified and signed electronically.  Dear patient,  As a result of recent federal legislation (The Federal Cures Act), you may   receive lab or pathology results from your procedure in your [facility]   account before your physician is able to contact you. Your physician or   their representative will relay the results to you  with their   recommendations at their soonest availability.  Thank you,  PROVATION

## 2024-09-26 NOTE — H&P
Short Stay Endoscopy History and Physical    PCP - Barbara Maya MD     Procedure - EGD & Colonoscopy  ASA - per anesthesia  Mallampati - per anesthesia  History of Anesthesia problems - no  Family history Anesthesia problems -  no   Plan of anesthesia - General    HPI:  This is a 28 y.o. male here for evaluation of :     abdominal pain, dysphagia, GERD, prior history of colon polyp, weight loss      ROS:  Constitutional: No fevers, chills, No weight loss  CV: No chest pain  Pulm: No cough, No shortness of breath  Ophtho: No vision changes  GI: see HPI  Derm: No rash    Medical History:  has a past medical history of Panic disorder (02/01/2024).    Surgical History:  has a past surgical history that includes Colonoscopy; Esophagogastroduodenoscopy (N/A, 6/20/2024); and Colonoscopy (N/A, 6/20/2024).    Family History: family history includes Cancer in his mother; Learning disabilities in his brother; Stroke in his paternal grandfather.. Otherwise no colon cancer, inflammatory bowel disease, or GI malignancies.    Social History:  reports that he has never smoked. He has never been exposed to tobacco smoke. He has never used smokeless tobacco. He reports that he does not currently use alcohol. He reports that he does not use drugs.    Review of patient's allergies indicates:  No Known Allergies    Medications:   Medications Prior to Admission   Medication Sig Dispense Refill Last Dose    cholecalciferol, vitD3,/vit K2 (VITAMIN D3-VITAMIN K2 ORAL) Take 1 capsule by mouth once daily.   9/25/2024    dextroamphetamine-amphetamine (ADDERALL XR) 15 MG 24 hr capsule Take 1 capsule (15 mg total) by mouth every morning. 90 capsule 0 9/25/2024    omeprazole (PRILOSEC) 40 MG capsule Take 1 capsule (40 mg total) by mouth before breakfast. Best taken 45-60 minutes before your first protein meal of the day- Breakfast. 90 capsule 3 9/26/2024    betamethasone dipropionate (DIPROLENE) 0.05 % ointment Apply twice daily to affected  area of skin as needed 45 g 2     ferrous sulfate 325 (65 FE) MG EC tablet Take 325 mg by mouth once daily.       hydrocortisone 2.5 % cream Apply topically 2 (two) times daily. 28 g 0     mometasone (ELOCON) 0.1 % ointment Apply twice daily to affected area as needed 60 g 1     omega-3 fatty acids/fish oil (FISH OIL-OMEGA-3 FATTY ACIDS) 300-1,000 mg capsule Take 1 capsule by mouth once daily.          Physical Exam:    Vital Signs:   Vitals:    09/26/24 1318   BP: (!) 152/98   Pulse: 75   Resp: 16   Temp: 97.5 °F (36.4 °C)         Labs:  Lab Results   Component Value Date    WBC 5.90 06/07/2024    HGB 16.1 06/07/2024    HCT 46.8 06/07/2024     06/07/2024    CHOL 125 02/15/2024    TRIG 59 02/15/2024    HDL 38 (L) 02/15/2024    ALT 19 08/14/2024    AST 17 08/14/2024     08/14/2024    K 4.0 08/14/2024     08/14/2024    CREATININE 1.0 08/14/2024    BUN 10 08/14/2024    CO2 27 08/14/2024    HGBA1C 4.6 02/15/2024       I have explained the risks and benefits of endoscopy procedures to the patient including but not limited to bleeding, perforation, infection, and death.  The patient was asked if they understand and allowed to ask any further questions to their satisfaction.      Brady Del Rio MD

## 2024-09-26 NOTE — TRANSFER OF CARE
"Anesthesia Transfer of Care Note    Patient: Joseph Torrez    Procedure(s) Performed: Procedure(s) (LRB):  EGD (ESOPHAGOGASTRODUODENOSCOPY) (N/A)  COLONOSCOPY (N/A)    Patient location: GI    Anesthesia Type: general    Transport from OR: Transported from OR on 2-3 L/min O2 by NC with adequate spontaneous ventilation    Post pain: adequate analgesia    Post assessment: no apparent anesthetic complications    Post vital signs: stable    Level of consciousness: awake    Nausea/Vomiting: no nausea/vomiting    Complications: none    Transfer of care protocol was followed      Last vitals: Visit Vitals  BP (!) 152/98 (BP Location: Left arm, Patient Position: Lying)   Pulse 75   Temp 36.4 °C (97.5 °F) (Temporal)   Resp 16   Ht 5' 9" (1.753 m)   Wt 49 kg (108 lb)   SpO2 100%   BMI 15.95 kg/m²     "

## 2024-10-02 LAB
COMMENT: NORMAL
FINAL PATHOLOGIC DIAGNOSIS: NORMAL
GROSS: NORMAL
Lab: NORMAL
MICROSCOPIC EXAM: NORMAL

## 2024-10-03 ENCOUNTER — OFFICE VISIT (OUTPATIENT)
Dept: SURGERY | Facility: CLINIC | Age: 29
End: 2024-10-03
Payer: MEDICAID

## 2024-10-03 VITALS
WEIGHT: 106.94 LBS | HEART RATE: 106 BPM | BODY MASS INDEX: 15.84 KG/M2 | SYSTOLIC BLOOD PRESSURE: 147 MMHG | HEIGHT: 69 IN | DIASTOLIC BLOOD PRESSURE: 83 MMHG

## 2024-10-03 DIAGNOSIS — K55.1 SMAS (SUPERIOR MESENTERIC ARTERY SYNDROME): Primary | ICD-10-CM

## 2024-10-03 PROCEDURE — 99999 PR PBB SHADOW E&M-EST. PATIENT-LVL III: CPT | Mod: PBBFAC,,, | Performed by: SURGERY

## 2024-10-03 PROCEDURE — 3079F DIAST BP 80-89 MM HG: CPT | Mod: CPTII,,, | Performed by: SURGERY

## 2024-10-03 PROCEDURE — 1160F RVW MEDS BY RX/DR IN RCRD: CPT | Mod: CPTII,,, | Performed by: SURGERY

## 2024-10-03 PROCEDURE — 99214 OFFICE O/P EST MOD 30 MIN: CPT | Mod: S$PBB,,, | Performed by: SURGERY

## 2024-10-03 PROCEDURE — 3044F HG A1C LEVEL LT 7.0%: CPT | Mod: CPTII,,, | Performed by: SURGERY

## 2024-10-03 PROCEDURE — 3008F BODY MASS INDEX DOCD: CPT | Mod: CPTII,,, | Performed by: SURGERY

## 2024-10-03 PROCEDURE — 99213 OFFICE O/P EST LOW 20 MIN: CPT | Mod: PBBFAC | Performed by: SURGERY

## 2024-10-03 PROCEDURE — 1159F MED LIST DOCD IN RCRD: CPT | Mod: CPTII,,, | Performed by: SURGERY

## 2024-10-03 PROCEDURE — 3077F SYST BP >= 140 MM HG: CPT | Mod: CPTII,,, | Performed by: SURGERY

## 2024-10-03 NOTE — PROGRESS NOTES
I have seen the patient, reviewed the Resident's history and physical, assessment and plan. I have personally interviewed and examined the patient at bedside and: agree with the findings.       28 y.o. male presents with bmi 19, ibs, adhd, psoriasis, gerd and possibly smas.  He started having trouble 2 years ago.      History 7/23/24: Initially he had jaundice and ruq pain.  He took a year off from school for this.  During this time he was not able to gaining weight due to diarrhea and pain.  He gets full on small amounts of food, feels regurgitation and stops.  He does better with oral fluids.  Ruq pain occurs only with fatty foods and epigastric pain occurs with eating too much.  He does have some food fear.  He says he has always been thin.  He was 128-9 prior to this illness and is not 120.     Interval history 10/3/24:  Bmi 15.  He continues to have difficulty eating with gerd like symptoms.  He is on a liquid diet without ability to gain weight.  He has followed up with hepatology and surg onc and his liver and possible choledococyst are ok for now.    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  He has been working on gaining weight but has lost even though working on a liquid diet.  Sma/celiac stenosis is likely an over call.  S/p jaundice with ruq pain episode with continued ruq pain episodes with fatty foods.     PLAN:     Consider j tube over tpn if diet doesn't work.  If weight gain doesn't work I suggest sma bypass.  Consider ct to follow up on  jejunal lesion.  Reconsult GI for thickened small bowel llq prior to j tube in case this becomes problematic to the tube.

## 2024-10-03 NOTE — PROGRESS NOTES
[facility] H&P    28 y.o. male presents with bmi 19, ibs, adhd, psoriasis, gerd and possibly smas.  He started having trouble 2 years ago.      History 7/23/24: Initially he had jaundice and ruq pain.  He took a year off from school for this.  During this time he was not able to gaining weight due to diarrhea and pain.  He gets full on small amounts of food, feels regurgitation and stops.  He does better with oral fluids.  Ruq pain occurs only with fatty foods and epigastric pain occurs with eating too much.  He does have some food fear.  He says he has always been thin.  He was 128-9 prior to this illness and is not 120.     Interval history 10/3/24:  Bmi 15.  He continues to have difficulty eating with gerd like symptoms.  He has seen a nutritionist who recommended diet supplementation with liquids to encourage weight gain but it has thus far been unsuccessful with the patinet having lost wieght since last visit here.  He has followed up with hepatology and surg onc and his liver and possible choledococyst are ok for now. He states similar symptoms as last visit with early satiety, RUQ pain with certain foods, and sensation of regurgitation but has not vomited. He is now interested in scheduling J-tube placement.     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 Unremarkable u/s  Ct shows small periumbilical hernia, normal mesenteric vessels, thicked small bowel in LLQ. Evidence of gastroesophageal reflux  Egd reviewed, 2cm hh, grade c esophagitis  Long egd reviewed, c/w smas  Mesenteric u/s shows 70% stenosis at sma and celiac  Fibroscan f 0-1    Past Medical History:   Diagnosis Date    Panic disorder 02/01/2024     Past Surgical History:   Procedure Laterality Date    COLONOSCOPY      COLONOSCOPY N/A 6/20/2024    Procedure: COLONOSCOPY;   Surgeon: Lucho Luo MD;  Location: [facility] (4TH FLR);  Service: Endoscopy;  Laterality: N/A;  6/14/2024 ref Essence Gaitan NP, urgent double, PEG instr via portal- [facility]  6/15-pre call complete-tb  6/19-LVM for pt to come in earlier-[facility]    COLONOSCOPY N/A 9/26/2024    Procedure: COLONOSCOPY;  Surgeon: Lucho Luo MD;  Location: [facility] (4TH FLR);  Service: Endoscopy;  Laterality: N/A;    ESOPHAGOGASTRODUODENOSCOPY N/A 6/20/2024    Procedure: EGD (ESOPHAGOGASTRODUODENOSCOPY);  Surgeon: Lucho Luo MD;  Location: [facility] (4TH FLR);  Service: Endoscopy;  Laterality: N/A;    ESOPHAGOGASTRODUODENOSCOPY N/A 9/26/2024    Procedure: EGD (ESOPHAGOGASTRODUODENOSCOPY);  Surgeon: Lucho Luo MD;  Location: [facility] (4TH FLR);  Service: Endoscopy;  Laterality: N/A;  referred by marely Gaitan. follow up egd colon 3 months, extended miralax prep, instructions mailed to pt portal.  9/17-pt r/s, updated instructions sent to [facility]  9/20-lvm for pre [id number] prep previous colon         Physical Exam  Constitutional:       Comments: Very thin, BMI 15     HENT:      Head: Normocephalic and atraumatic.   Eyes:      Extraocular Movements: Extraocular movements intact.   Cardiovascular:      Rate and Rhythm: Tachycardia present.   Pulmonary:      Effort: Pulmonary effort is normal.   Abdominal:      General: Abdomen is flat.      Palpations: Abdomen is soft.   Skin:     General: Skin is warm and dry.   Neurological:      General: No focal deficit present.      Mental Status: He is alert and oriented to person, place, and time.           ASSESSMENT/PLAN:      SMAS, 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  He has been working on gaining weight but has lost even though working on a liquid diet. We will plan to have him follow up with GI first for possible IBD syndrome given LLQ small bowel lesion. Will then schedule for J-tube placement after.     PLAN:     - Referral to GI  to workup possible IBD given small bowel lesion   - Schedule J-tube placement after GI workup.  - RTC as needed in meantime

## 2024-10-07 ENCOUNTER — PATIENT MESSAGE (OUTPATIENT)
Dept: SURGERY | Facility: CLINIC | Age: 29
End: 2024-10-07
Payer: MEDICAID

## 2024-10-07 ENCOUNTER — PATIENT MESSAGE (OUTPATIENT)
Dept: GASTROENTEROLOGY | Facility: CLINIC | Age: 29
End: 2024-10-07
Payer: MEDICAID

## 2024-10-09 ENCOUNTER — OFFICE VISIT (OUTPATIENT)
Dept: GASTROENTEROLOGY | Facility: CLINIC | Age: 29
End: 2024-10-09
Payer: MEDICAID

## 2024-10-09 VITALS
WEIGHT: 107.56 LBS | HEIGHT: 69 IN | DIASTOLIC BLOOD PRESSURE: 98 MMHG | BODY MASS INDEX: 15.93 KG/M2 | SYSTOLIC BLOOD PRESSURE: 149 MMHG | HEART RATE: 101 BPM

## 2024-10-09 DIAGNOSIS — E46 MALNUTRITION, UNSPECIFIED TYPE: ICD-10-CM

## 2024-10-09 DIAGNOSIS — K63.9 BOWEL WALL THICKENING: ICD-10-CM

## 2024-10-09 DIAGNOSIS — K55.1 SMAS (SUPERIOR MESENTERIC ARTERY SYNDROME): Primary | ICD-10-CM

## 2024-10-09 DIAGNOSIS — E80.4 GILBERT SYNDROME: ICD-10-CM

## 2024-10-09 PROCEDURE — 99215 OFFICE O/P EST HI 40 MIN: CPT | Mod: S$PBB,,, | Performed by: STUDENT IN AN ORGANIZED HEALTH CARE EDUCATION/TRAINING PROGRAM

## 2024-10-09 PROCEDURE — 3008F BODY MASS INDEX DOCD: CPT | Mod: CPTII,,, | Performed by: STUDENT IN AN ORGANIZED HEALTH CARE EDUCATION/TRAINING PROGRAM

## 2024-10-09 PROCEDURE — 3077F SYST BP >= 140 MM HG: CPT | Mod: CPTII,,, | Performed by: STUDENT IN AN ORGANIZED HEALTH CARE EDUCATION/TRAINING PROGRAM

## 2024-10-09 PROCEDURE — 1159F MED LIST DOCD IN RCRD: CPT | Mod: CPTII,,, | Performed by: STUDENT IN AN ORGANIZED HEALTH CARE EDUCATION/TRAINING PROGRAM

## 2024-10-09 PROCEDURE — 3080F DIAST BP >= 90 MM HG: CPT | Mod: CPTII,,, | Performed by: STUDENT IN AN ORGANIZED HEALTH CARE EDUCATION/TRAINING PROGRAM

## 2024-10-09 PROCEDURE — 99999 PR PBB SHADOW E&M-EST. PATIENT-LVL III: CPT | Mod: PBBFAC,,, | Performed by: STUDENT IN AN ORGANIZED HEALTH CARE EDUCATION/TRAINING PROGRAM

## 2024-10-09 PROCEDURE — 3044F HG A1C LEVEL LT 7.0%: CPT | Mod: CPTII,,, | Performed by: STUDENT IN AN ORGANIZED HEALTH CARE EDUCATION/TRAINING PROGRAM

## 2024-10-09 PROCEDURE — 99213 OFFICE O/P EST LOW 20 MIN: CPT | Mod: PBBFAC | Performed by: STUDENT IN AN ORGANIZED HEALTH CARE EDUCATION/TRAINING PROGRAM

## 2024-10-09 NOTE — PROGRESS NOTES
[facility]    Reason for visit: The primary encounter diagnosis was SMAS (superior mesenteric artery syndrome). Diagnoses of Bowel wall thickening, Malnutrition, unspecified type, and Gilbert syndrome were also pertinent to this visit.  Referring Provider/PCP: Barbara Maya MD    History of Present Illness:  Joseph Torrez is a 29 y.o. male with a history of ADD, psoriasis who is presenting for initial evaluation of several GI problems.    Baseline weight in 2021 140.  The patient's symptoms started about 2 years ago when he was in [place]. He developed acute onset right upper quadrant abdominal pain and jaundice so he went to the emergency department there to be evaluated.  He thinks his bilirubin was around 5-6.  He had an ultrasound and MRCP that was negative for gallstones.  There might have been mild CBD dilation.  He was seen by surgeon there that did not think he had a biliary problem, he may have had Gilbert syndrome.  After that, his pain subsided somewhat with intermittent episodes, this is associated with significant decrease in his oral intake so he lost weight from 130-133 (in 2022 before this incident) to about 120 about a year later.  He moved to [place] in 2023, was seen by gastroenterologist who performed a CT and a colonoscopy that was all reportedly normal.  The patient brought the images with him, I personally reviewed them and there appears to be small bowel wall thickening in the left upper quadrant.  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  His weight was about 113 as of the summer.  He feels like his pain gets worse when he eats, and he gets severe abdominal spasms if he eats a large meal.  He also reports significant regurgitation and reflux with large meals.  Once every 4-6 weeks he has an episode where he feels very fatigued with decreased appetite and worsening diarrhea where he has to run to the bathroom every 15-30  minutes.  These episodes usually resolve within a day or 2.  Denies blood in the stool.    Currently his right upper quadrant abdominal pain is overall tolerable and does not bother him too much.  He has 1 bowel movement a day that is sometimes loose.  He has been working with a dietitian and has been able to maintain his weight stable, currently he is eating approximately 1800 calories.    He has been seen here within the [facility] system by Essence Gaitan, underwent a right upper quadrant ultrasound that was negative.  He had an EGD and colonoscopy, images reviewed.  EGD showed [place] grade C esophagitis, narrowing in the 3rd portion of the duodenal concerning for SMA syndrome.  The colonoscopy had poor prep.  Because of that he had a CT enterography that showed a dilated stomach and proximal duodenal up to the 3rd portion concerning for SMA syndrome, there was also mild distal CBD dilation.  There was also thickening of some of the jejunal loops.  He had a mesenteric Doppler that showed 70% stenosis in the SMA.  He underwent an MRCP that showed mild fusiform dilation in the central intrahepatic biliary ducts concerning for type for a choledochal cyst.  Repeat EGD and colonoscopy showed resolution of the esophagitis.  Colonoscopy showed erosions in the TI that were biopsied and showed active inflammation with no evidence of chronicity and lymphoid aggregates.  He was seen by hepatology for abnormal liver enzymes and was diagnosed with Gilbert syndrome.  He was seen by Dr. Robinson Goodman to discuss the choledochal cyst, he thought that he has more pressing issues right now with SMA syndrome, jejunal thickening which takes priority.  He was seen by Dr. Kearns who thought the SMA stenosis was likely an over call.  He recommended J-tube to gain weight for treatment of SMA syndrome, but workup of jejunal thickening prior to J-tube insertion, which prompted his visit today.    The patient denies a family history of IBD.  No  smoking, alcohol or drug use.  No prior surgeries.  Gets occasional oral ulcers.  Has psoriasis but no other extraintestinal manifestations of IBD.      Physical Exam:  Constitutional:  not in acute distress, thin  HENT: Head: Normal, normocephalic, atraumatic.  Eyes: conjunctiva clear and sclera nonicteric  Skin: normal color  Neurological: alert, oriented x3  Psychiatric: mood and affect are within normal limits, pt is a good historian; no memory problems were noted    Laboratory:  Lab Results   Component Value Date/Time    HGB 16.1 06/07/2024 07:54 AM    HGB 16.3 02/15/2024 07:35 AM    AST 17 08/14/2024 08:50 AM    AST 22 06/07/2024 07:54 AM    ALT 19 08/14/2024 08:50 AM    ALT 20 06/07/2024 07:54 AM    BILITOT 1.6 (H) 08/14/2024 08:50 AM    BILITOT 2.7 (H) 06/07/2024 07:54 AM     Reviewed.  Mildly elevated bilirubin, mostly indirect    Imaging:  See HPI.    Endoscopy:  See HPI    Assessment:  Joseph Torrez is a 29 y.o. 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  Symptoms started with jaundice and right upper quadrant abdominal pain followed by postprandial abdominal pain with reduced p.o. intake over the years and significant weight loss.  I am not sure what triggered the initial episode of hyperbilirubinemia.  Could be an acute illness that cause hyperbilirubinemia in the setting of Gilbert syndrome.  Choledochal cyst although questionable might have also been a contributor since his bilirubin remains elevated until now.  He had jejunal inflammation on CT in 2023 which remains persistent on recent CT enterography.  Combined with erosions in the terminal ileum on recent colonoscopy, this is concerning for Crohn's disease.  Ideally would like to get a VCE but in the setting of duodenal obstruction/SMA syndrome I am worried the capsule be retained.  Will refer to Dr. Smith for  an enteroscopy for direct visualization of his intestines.  Would hold off on J-tube placement for now, if he has active Crohn's in his small bowel, J-tube placement can cause issues in the long term.  Crohn's disease would explain the bouts of abdominal pain and diarrhea that he gets, and inability to gain weight.  Ongoing inflammation could be causing persistent hyperbilirubinemia in the setting of Gilbert's syndrome.    Plan:  Deep enteroscopy to evaluate small bowel inflammation, will discuss that with Dr Smith  Continue dietary supplementation and increasing calorie intake as tolerated with the help of the dietitian  Continue hepatology follow-up for hyperbilirubinemia  Repeat MRCP in a year per panc- bili surgery to follow-up on possible choledochal cyst  Of the patient was diagnosed with Crohn's, will plan on treatment which might be sufficient to help the patient gain weight without a J-tube  Closer weight monitoring, TPN if drops even lower    Nicole Kaba MD  Gastroenterology and Hepatology    No orders of the defined types were placed in this encounter.  I spent 60 minutes today on face to face care, counseling, records review, coordination of care and documentation.

## 2024-10-11 DIAGNOSIS — F90.2 ATTENTION DEFICIT HYPERACTIVITY DISORDER (ADHD), COMBINED TYPE: ICD-10-CM

## 2024-10-11 NOTE — TELEPHONE ENCOUNTER
No care due was identified.  [facility] Embedded Care Due Messages. Reference number: [id number].   10/11/2024 3:42:13 PM CDT

## 2024-10-14 ENCOUNTER — PATIENT MESSAGE (OUTPATIENT)
Dept: GASTROENTEROLOGY | Facility: CLINIC | Age: 29
End: 2024-10-14
Payer: MEDICAID

## 2024-10-16 ENCOUNTER — PATIENT MESSAGE (OUTPATIENT)
Dept: RESEARCH | Facility: HOSPITAL | Age: 29
End: 2024-10-16
Payer: MEDICAID

## 2024-10-16 RX ORDER — DEXTROAMPHETAMINE SACCHARATE, AMPHETAMINE ASPARTATE MONOHYDRATE, DEXTROAMPHETAMINE SULFATE AND AMPHETAMINE SULFATE 3.75; 3.75; 3.75; 3.75 MG/1; MG/1; MG/1; MG/1
15 CAPSULE, EXTENDED RELEASE ORAL EVERY MORNING
Qty: 30 CAPSULE | Refills: 0 | Status: SHIPPED | OUTPATIENT
Start: 2024-10-16

## 2024-10-17 ENCOUNTER — PATIENT MESSAGE (OUTPATIENT)
Dept: RESEARCH | Facility: HOSPITAL | Age: 29
End: 2024-10-17
Payer: MEDICAID

## 2024-10-22 ENCOUNTER — PATIENT MESSAGE (OUTPATIENT)
Dept: RESEARCH | Facility: HOSPITAL | Age: 29
End: 2024-10-22
Payer: MEDICAID

## 2024-10-25 DIAGNOSIS — K55.1 SMAS (SUPERIOR MESENTERIC ARTERY SYNDROME): ICD-10-CM

## 2024-10-25 DIAGNOSIS — R93.3 ABNORMAL CT SCAN, SMALL BOWEL: Primary | ICD-10-CM

## 2024-10-26 ENCOUNTER — TELEPHONE (OUTPATIENT)
Dept: ENDOSCOPY | Facility: HOSPITAL | Age: 29
End: 2024-10-26
Payer: MEDICAID

## 2024-10-26 NOTE — TELEPHONE ENCOUNTER
Spoke to patient to schedule procedure(s) Antegrade Double Balloon       Physician to perform procedure(s) Dr. MIGUEL ANGEL Smith  Date of Procedure (s) 12/2/2024  Arrival Time 6:30 AM  Time of Procedure(s) 7:30 AM   Location of Procedure(s) [facility] [address]nd Floor  Type of Rx Prep sent to patient: Other  Instructions provided to patient via [facility]    Patient was informed on the following information and verbalized understanding. Screening questionnaire reviewed with patient and complete. If procedure requires anesthesia, a responsible adult needs to be present to accompany the patient home, patient cannot drive after receiving anesthesia. Appointment details are tentative, especially check-in time. Patient will receive a prep-op call 7 days prior to confirm check-in time for procedure. If applicable the patient should contact their pharmacy to verify Rx for procedure prep is ready for pick-up. Patient was advised to call the scheduling department at [phone] if pharmacy states no Rx is available. Patient was advised to call the endoscopy scheduling department if any questions or concerns arise.      [facility] Scheduling Department

## 2024-10-30 ENCOUNTER — PATIENT MESSAGE (OUTPATIENT)
Dept: GASTROENTEROLOGY | Facility: CLINIC | Age: 29
End: 2024-10-30
Payer: MEDICAID

## 2024-11-05 ENCOUNTER — PATIENT MESSAGE (OUTPATIENT)
Dept: RESEARCH | Facility: HOSPITAL | Age: 29
End: 2024-11-05
Payer: MEDICAID

## 2024-11-18 DIAGNOSIS — K22.10 EROSIVE ESOPHAGITIS: ICD-10-CM

## 2024-11-18 DIAGNOSIS — F90.2 ATTENTION DEFICIT HYPERACTIVITY DISORDER (ADHD), COMBINED TYPE: ICD-10-CM

## 2024-11-18 DIAGNOSIS — R13.10 ODYNOPHAGIA: ICD-10-CM

## 2024-11-18 DIAGNOSIS — K44.9 HIATAL HERNIA: ICD-10-CM

## 2024-11-18 RX ORDER — DEXTROAMPHETAMINE SACCHARATE, AMPHETAMINE ASPARTATE MONOHYDRATE, DEXTROAMPHETAMINE SULFATE AND AMPHETAMINE SULFATE 3.75; 3.75; 3.75; 3.75 MG/1; MG/1; MG/1; MG/1
15 CAPSULE, EXTENDED RELEASE ORAL EVERY MORNING
Qty: 9 CAPSULE | Refills: 0 | Status: SHIPPED | OUTPATIENT
Start: 2024-11-18 | End: 2024-11-27

## 2024-11-18 NOTE — TELEPHONE ENCOUNTER
No care due was identified.  [facility] Embedded Care Due Messages. Reference number: [id number].   11/18/2024 1:38:23 PM CST

## 2024-11-19 RX ORDER — OMEPRAZOLE 40 MG/1
40 CAPSULE, DELAYED RELEASE ORAL
Qty: 90 CAPSULE | Refills: 3 | Status: SHIPPED | OUTPATIENT
Start: 2024-11-19 | End: 2025-11-19

## 2024-11-27 ENCOUNTER — OFFICE VISIT (OUTPATIENT)
Dept: FAMILY MEDICINE | Facility: CLINIC | Age: 29
End: 2024-11-27
Payer: MEDICAID

## 2024-11-27 VITALS
SYSTOLIC BLOOD PRESSURE: 126 MMHG | BODY MASS INDEX: 16.33 KG/M2 | OXYGEN SATURATION: 99 % | DIASTOLIC BLOOD PRESSURE: 86 MMHG | TEMPERATURE: 98 F | HEART RATE: 75 BPM | WEIGHT: 110.25 LBS | HEIGHT: 69 IN

## 2024-11-27 DIAGNOSIS — Z00.00 ANNUAL PHYSICAL EXAM: ICD-10-CM

## 2024-11-27 DIAGNOSIS — F90.2 ATTENTION DEFICIT HYPERACTIVITY DISORDER (ADHD), COMBINED TYPE: Primary | ICD-10-CM

## 2024-11-27 DIAGNOSIS — K55.1 SMAS (SUPERIOR MESENTERIC ARTERY SYNDROME): ICD-10-CM

## 2024-11-27 DIAGNOSIS — Z23 NEED FOR COVID-19 VACCINE: ICD-10-CM

## 2024-11-27 PROBLEM — R10.11 RUQ PAIN: Status: RESOLVED | Noted: 2024-06-06 | Resolved: 2024-11-27

## 2024-11-27 PROBLEM — R10.13 EPIGASTRIC PAIN: Status: RESOLVED | Noted: 2024-02-22 | Resolved: 2024-11-27

## 2024-11-27 PROCEDURE — 90480 ADMN SARSCOV2 VAC 1/ONLY CMP: CPT | Mod: PBBFAC,PO

## 2024-11-27 PROCEDURE — 99213 OFFICE O/P EST LOW 20 MIN: CPT | Mod: PBBFAC,PO | Performed by: INTERNAL MEDICINE

## 2024-11-27 PROCEDURE — 99999 PR PBB SHADOW E&M-EST. PATIENT-LVL III: CPT | Mod: PBBFAC,,, | Performed by: INTERNAL MEDICINE

## 2024-11-27 PROCEDURE — 99999PBSHW PR PBB SHADOW TECHNICAL ONLY FILED TO HB: Mod: PBBFAC,,,

## 2024-11-27 PROCEDURE — 91320 SARSCV2 VAC 30MCG TRS-SUC IM: CPT | Mod: PBBFAC,PO

## 2024-11-27 RX ORDER — DEXTROAMPHETAMINE SACCHARATE, AMPHETAMINE ASPARTATE MONOHYDRATE, DEXTROAMPHETAMINE SULFATE AND AMPHETAMINE SULFATE 3.75; 3.75; 3.75; 3.75 MG/1; MG/1; MG/1; MG/1
15 CAPSULE, EXTENDED RELEASE ORAL EVERY MORNING
Qty: 30 CAPSULE | Refills: 0 | Status: SHIPPED | OUTPATIENT
Start: 2025-01-27 | End: 2025-02-26

## 2024-11-27 RX ORDER — DEXTROAMPHETAMINE SACCHARATE, AMPHETAMINE ASPARTATE MONOHYDRATE, DEXTROAMPHETAMINE SULFATE AND AMPHETAMINE SULFATE 3.75; 3.75; 3.75; 3.75 MG/1; MG/1; MG/1; MG/1
15 CAPSULE, EXTENDED RELEASE ORAL EVERY MORNING
Qty: 30 CAPSULE | Refills: 0 | Status: SHIPPED | OUTPATIENT
Start: 2024-12-27 | End: 2025-01-26

## 2024-11-27 RX ORDER — GUANFACINE 4 MG/1
1 TABLET, EXTENDED RELEASE ORAL DAILY
Qty: 30 TABLET | Refills: 0 | Status: SHIPPED | OUTPATIENT
Start: 2024-11-27 | End: 2024-12-27

## 2024-11-27 RX ORDER — DEXTROAMPHETAMINE SACCHARATE, AMPHETAMINE ASPARTATE MONOHYDRATE, DEXTROAMPHETAMINE SULFATE AND AMPHETAMINE SULFATE 3.75; 3.75; 3.75; 3.75 MG/1; MG/1; MG/1; MG/1
15 CAPSULE, EXTENDED RELEASE ORAL EVERY MORNING
Qty: 30 CAPSULE | Refills: 0 | Status: SHIPPED | OUTPATIENT
Start: 2025-02-27 | End: 2025-03-29

## 2024-11-27 RX ADMIN — COVID-19 VACCINE, MRNA 0.3 ML: 0.04 INJECTION, SUSPENSION INTRAMUSCULAR at 08:11

## 2024-11-27 NOTE — PROGRESS NOTES
Health Maintenance Due   Topic     COVID-19 Vaccine (4 - 2024-25 season) Pt agree to get today

## 2024-11-27 NOTE — ASSESSMENT & PLAN NOTE
On EGD    - Scheduled an enteroscopy to evaluate the jejunal lesion and rule out Crohn's disease.  - Developed a contingent treatment plan based on enteroscopy results: if Crohn's disease is not detected, surgery for stoma may be scheduled; if Crohn's is found, it will be treated before considering surgery.  - Assessed nutritional status in context of SMA diagnosis.  - Noted the patient has been unable to gain weight independently but has recently gained 3 lbs.  - Discussed importance of calcium intake for bone health, especially in context of SMA-related malnutrition risks.

## 2024-11-27 NOTE — ASSESSMENT & PLAN NOTE
Chronic, reports effects wearing off    - patient would like to try guanfacine to adjunct adderall (1 month supply)  - continue adderall 15mg daily (3 month supply)  - if guanfacine not effective, ok to start Adderall 5mg tab (IR) in the afternoon

## 2024-11-27 NOTE — PROGRESS NOTES
Chief Complaint: Follow-up (Med refill)      Joseph Torrez  is a 29 y.o. year old patient who presents today for     History of Present Illness    CHIEF COMPLAINT:  Joseph presents today for follow-up regarding SMA and GI issues.    SUPERIOR MESENTERIC ARTERY SYNDROME (SMA) AND GI CONCERNS:  He has an enteroscopy scheduled for the upcoming Monday. A CT revealed a jejunal lesion of unclear etiology, possibly due to a transient infection. He recently gained three lbs but has been unable to gain weight independently. He is considering a jejunostomy but is concerned about the potential for Crohn's disease.    MEDICATIONS AND SUPPLEMENTS:  He is currently taking Adderall XR for ADHD and is considering adding guanfacine to manage symptoms. He is also taking vitamin D supplements and multivitamins.    PREVENTIVE CARE:  He is unsure if he has completed the HPV vaccination series.      ROS:  General: -fever, -chills, -fatigue, -weight gain, -weight loss  Eyes: -vision changes, -redness, -discharge  ENT: -ear pain, -nasal congestion, -sore throat  Cardiovascular: -chest pain, -palpitations, -lower extremity edema  Respiratory: -cough, -shortness of breath  Gastrointestinal: -abdominal pain, -nausea, -vomiting, -diarrhea, -constipation, -blood in stool  Genitourinary: -dysuria, -hematuria, -frequency  Musculoskeletal: -joint pain, -muscle pain  Skin: -rash, -lesion  Neurological: -headache, -dizziness, -numbness, -tingling  Psychiatric: -anxiety, -depression, -sleep difficulty         Past Medical History:   Diagnosis Date    Panic disorder 02/01/2024       Past Surgical History:   Procedure Laterality Date    COLONOSCOPY      COLONOSCOPY N/A 6/20/2024    Procedure: COLONOSCOPY;  Surgeon: Lucho Luo MD;  Location: [facility] ([address] [place] [address]);  Service: Endoscopy;  Laterality: N/A;  6/14/2024 ref Essence Gaitan NP, urgent double, PEG instr via portal- [facility]  6/15-pre call complete-tb  6/19-LVM for pt to come in earlier-[facility]     COLONOSCOPY N/A 9/26/2024    Procedure: COLONOSCOPY;  Surgeon: Lucho Luo MD;  Location: [facility] ENDO (4TH FLR);  Service: Endoscopy;  Laterality: N/A;    ESOPHAGOGASTRODUODENOSCOPY N/A 6/20/2024    Procedure: EGD (ESOPHAGOGASTRODUODENOSCOPY);  Surgeon: Lucho Luo MD;  Location: [facility] ENDO (4TH FLR);  Service: Endoscopy;  Laterality: N/A;    ESOPHAGOGASTRODUODENOSCOPY N/A 9/26/2024    Procedure: EGD (ESOPHAGOGASTRODUODENOSCOPY);  Surgeon: Lucho Luo MD;  Location: [facility] ENDO (4TH FLR);  Service: Endoscopy;  Laterality: N/A;  referred by marely Gaitan. follow up egd colon 3 months, extended miralax prep, instructions mailed to pt portal.cf  9/17-pt r/s, updated instructions sent to portal-[facility]  9/20-lv for pre [id number] prep previous colon        Family History   Problem Relation Name Age of Onset    Cancer Mother Gaby Clay     Stroke Paternal Grandfather Abi Rivas     Learning disabilities Brother Serge Samayoa         Social History     Socioeconomic History    Marital status: Single   Tobacco Use    Smoking status: Never     Passive exposure: Never    Smokeless tobacco: Never   Substance and Sexual Activity    Alcohol use: Not Currently    Drug use: Never    Sexual activity: Not Currently     Partners: Female     Birth control/protection: Condom     Social Drivers of Health     Financial Resource Strain: High Risk (1/31/2024)    Overall Financial Resource Strain (CARDIA)     Difficulty of Paying Living Expenses: Hard   Food Insecurity: Patient Declined (1/31/2024)    Hunger Vital Sign     Worried About Running Out of Food in the Last Year: Patient declined     Ran Out of Food in the Last Year: Patient declined   Transportation Needs: No Transportation Needs (1/31/2024)    PRAPARE - Transportation     Lack of Transportation (Medical): No     Lack of Transportation (Non-Medical): No   Physical Activity: Insufficiently Active (1/31/2024)    Exercise Vital Sign     Days of Exercise  per Week: 3 days     Minutes of Exercise per Session: 40 min   Stress: No Stress Concern Present (1/31/2024)    [employer] - Occupational Stress Questionnaire     Feeling of Stress : Only a little   Housing Stability: Patient Declined (1/31/2024)    Housing Stability Vital Sign     Unable to Pay for Housing in the Last Year: Patient declined     Unstable Housing in the Last Year: Patient declined         Current Outpatient Medications:     cholecalciferol, vitD3,/vit K2 (VITAMIN D3-VITAMIN K2 ORAL), Take 1 capsule by mouth once daily., Disp: , Rfl:     dextroamphetamine-amphetamine (ADDERALL XR) 15 MG 24 hr capsule, Take 1 capsule (15 mg total) by mouth every morning. for 9 days, Disp: 9 capsule, Rfl: 0    ferrous sulfate 325 (65 FE) MG EC tablet, Take 325 mg by mouth once daily., Disp: , Rfl:     hydrocortisone 2.5 % cream, Apply topically 2 (two) times daily., Disp: 28 g, Rfl: 0    omega-3 fatty acids/fish oil (FISH OIL-OMEGA-3 FATTY ACIDS) 300-1,000 mg capsule, Take 1 capsule by mouth once daily., Disp: , Rfl:     omeprazole (PRILOSEC) 40 MG capsule, Take 1 capsule (40 mg total) by mouth before breakfast. Best taken 45-60 minutes before your first protein meal of the day- Breakfast., Disp: 90 capsule, Rfl: 3    [START ON 12/27/2024] dextroamphetamine-amphetamine (ADDERALL XR) 15 MG 24 hr capsule, Take 1 capsule (15 mg total) by mouth every morning., Disp: 30 capsule, Rfl: 0    [START ON 1/27/2025] dextroamphetamine-amphetamine (ADDERALL XR) 15 MG 24 hr capsule, Take 1 capsule (15 mg total) by mouth every morning., Disp: 30 capsule, Rfl: 0    [START ON 2/27/2025] dextroamphetamine-amphetamine (ADDERALL XR) 15 MG 24 hr capsule, Take 1 capsule (15 mg total) by mouth every morning., Disp: 30 capsule, Rfl: 0    guanFACINE (INTUNIV ER) 4 mg Tb24, Take 1 tablet (4 mg total) by mouth once daily., Disp: 30 tablet, Rfl: 0  No current facility-administered medications for this visit.            Objective:      Vitals:    11/27/24 0752   BP: 126/86   Pulse: 75   Temp: 98.3 °F (36.8 °C)       Physical Exam  Constitutional:       Comments: Thin body habitus   HENT:      Head: Normocephalic and atraumatic.   Cardiovascular:      Rate and Rhythm: Normal rate.   Musculoskeletal:         General: Normal range of motion.   Skin:     General: Skin is warm and dry.   Neurological:      General: No focal deficit present.      Mental Status: He is alert and oriented to person, place, and time.          Assessment:       1. Attention deficit hyperactivity disorder (ADHD), combined type    2. SMAS (superior mesenteric artery syndrome)    3. Need for COVID-19 vaccine    4. Annual physical exam          Plan:   1. Attention deficit hyperactivity disorder (ADHD), combined type  Assessment & Plan:  Chronic, reports effects wearing off    - patient would like to try guanfacine to adjunct adderall (1 month supply)  - continue adderall 15mg daily (3 month supply)  - if guanfacine not effective, ok to start Adderall 5mg tab (IR) in the afternoon      Orders:  -     guanFACINE (INTUNIV ER) 4 mg Tb24; Take 1 tablet (4 mg total) by mouth once daily.  Dispense: 30 tablet; Refill: 0  -     dextroamphetamine-amphetamine (ADDERALL XR) 15 MG 24 hr capsule; Take 1 capsule (15 mg total) by mouth every morning.  Dispense: 30 capsule; Refill: 0  -     dextroamphetamine-amphetamine (ADDERALL XR) 15 MG 24 hr capsule; Take 1 capsule (15 mg total) by mouth every morning.  Dispense: 30 capsule; Refill: 0  -     dextroamphetamine-amphetamine (ADDERALL XR) 15 MG 24 hr capsule; Take 1 capsule (15 mg total) by mouth every morning.  Dispense: 30 capsule; Refill: 0    2. SMAS (superior mesenteric artery syndrome)  Assessment & Plan:  On EGD    - Scheduled an enteroscopy to evaluate the jejunal lesion and rule out Crohn's disease.  - Developed a contingent treatment plan based on enteroscopy results: if Crohn's disease is not detected, surgery for  stoma may be scheduled; if Crohn's is found, it will be treated before considering surgery.  - Assessed nutritional status in context of SMA diagnosis.  - Noted the patient has been unable to gain weight independently but has recently gained 3 lbs.  - Discussed importance of calcium intake for bone health, especially in context of SMA-related malnutrition risks.    Orders:  -     Calcitriol; Future; Expected date: 11/27/2024    3. Need for COVID-19 vaccine  -     COVID-19 (Pfizer) 30 mcg/0.3 mL IM vaccine (>/= 11 yo) 0.3 mL    4. Annual physical exam  -     HEPATIC FUNCTION PANEL; Future; Expected date: 11/27/2024  -     Basic Metabolic Panel; Future; Expected date: 11/27/2024  -     Lipid Panel; Future; Expected date: 11/27/2024  -     Hemoglobin A1C; Future; Expected date: 11/27/2024  -     CBC Auto Differential; Future; Expected date: 11/27/2024       HPV VACCINATION:  - Explained the HPV vaccination schedule and importance of completing the series.  - Advised the patient to restart the HPV vaccination series if not completed.  - Recommend completing the three-dose HPV vaccination series, with the second dose in 1-2 months and the third dose 6 months from the first.    MEDICATIONS/SUPPLEMENTS:  - Continued multivitamin supplementation.  - Recommend adding calcium supplements to the patient's regimen.  - Continued vitamin D supplementation.  - Emphasized the importance of vitamin D and calcium for bone health, especially in relation to SMA and malnutrition.  - Recommend adding calcium supplements to the patient's current regimen of vitamin D supplements and multivitamins.  - Joseph to consider taking calcium supplements if dietary intake insufficient.    LABS:  - Labs ordered to be done before next annual visit.    FOLLOW UP:  - Follow up in 4 months.         Follow up in about 4 months (around 3/27/2025) for Annual.    This note was generated with the assistance of ambient listening technology. Verbal consent was  obtained by the patient and accompanying visitor(s) for the recording of patient appointment to facilitate this note. I attest to having reviewed and edited the generated note for accuracy, though some syntax or spelling errors may persist. Please contact the author of this note for any clarification.

## 2024-11-27 NOTE — H&P (VIEW-ONLY)
Chief Complaint: Follow-up (Med refill)      Joseph Torrez  is a 29 y.o. year old patient who presents today for     History of Present Illness    CHIEF COMPLAINT:  Joseph presents today for follow-up regarding SMA and GI issues.    SUPERIOR MESENTERIC ARTERY SYNDROME (SMA) AND GI CONCERNS:  He has an enteroscopy scheduled for the upcoming Monday. A CT revealed a jejunal lesion of unclear etiology, possibly due to a transient infection. He recently gained three lbs but has been unable to gain weight independently. He is considering a jejunostomy but is concerned about the potential for Crohn's disease.    MEDICATIONS AND SUPPLEMENTS:  He is currently taking Adderall XR for ADHD and is considering adding guanfacine to manage symptoms. He is also taking vitamin D supplements and multivitamins.    PREVENTIVE CARE:  He is unsure if he has completed the HPV vaccination series.      ROS:  General: -fever, -chills, -fatigue, -weight gain, -weight loss  Eyes: -vision changes, -redness, -discharge  ENT: -ear pain, -nasal congestion, -sore throat  Cardiovascular: -chest pain, -palpitations, -lower extremity edema  Respiratory: -cough, -shortness of breath  Gastrointestinal: -abdominal pain, -nausea, -vomiting, -diarrhea, -constipation, -blood in stool  Genitourinary: -dysuria, -hematuria, -frequency  Musculoskeletal: -joint pain, -muscle pain  Skin: -rash, -lesion  Neurological: -headache, -dizziness, -numbness, -tingling  Psychiatric: -anxiety, -depression, -sleep difficulty         Past Medical History:   Diagnosis Date    Panic disorder 02/01/2024       Past Surgical History:   Procedure Laterality Date    COLONOSCOPY      COLONOSCOPY N/A 6/20/2024    Procedure: COLONOSCOPY;  Surgeon: Lucho Luo MD;  Location: [facility] ([address] [place] [address]);  Service: Endoscopy;  Laterality: N/A;  6/14/2024 ref Essence Gaitan NP, urgent double, PEG instr via portal- [facility]  6/15-pre call complete-tb  6/19-LVM for pt to come in earlier-[facility]     COLONOSCOPY N/A 9/26/2024    Procedure: COLONOSCOPY;  Surgeon: Lucho Luo MD;  Location: [facility] ENDO (4TH FLR);  Service: Endoscopy;  Laterality: N/A;    ESOPHAGOGASTRODUODENOSCOPY N/A 6/20/2024    Procedure: EGD (ESOPHAGOGASTRODUODENOSCOPY);  Surgeon: Lucho Luo MD;  Location: [facility] ENDO (4TH FLR);  Service: Endoscopy;  Laterality: N/A;    ESOPHAGOGASTRODUODENOSCOPY N/A 9/26/2024    Procedure: EGD (ESOPHAGOGASTRODUODENOSCOPY);  Surgeon: Lucho Luo MD;  Location: [facility] ENDO (4TH FLR);  Service: Endoscopy;  Laterality: N/A;  referred by marely Gaitan. follow up egd colon 3 months, extended miralax prep, instructions mailed to pt portal.cf  9/17-pt r/s, updated instructions sent to portal-[facility]  9/20-lv for pre [id number] prep previous colon        Family History   Problem Relation Name Age of Onset    Cancer Mother Gaby Clay     Stroke Paternal Grandfather Abi Rivas     Learning disabilities Brother Serge Samayoa         Social History     Socioeconomic History    Marital status: Single   Tobacco Use    Smoking status: Never     Passive exposure: Never    Smokeless tobacco: Never   Substance and Sexual Activity    Alcohol use: Not Currently    Drug use: Never    Sexual activity: Not Currently     Partners: Female     Birth control/protection: Condom     Social Drivers of Health     Financial Resource Strain: High Risk (1/31/2024)    Overall Financial Resource Strain (CARDIA)     Difficulty of Paying Living Expenses: Hard   Food Insecurity: Patient Declined (1/31/2024)    Hunger Vital Sign     Worried About Running Out of Food in the Last Year: Patient declined     Ran Out of Food in the Last Year: Patient declined   Transportation Needs: No Transportation Needs (1/31/2024)    PRAPARE - Transportation     Lack of Transportation (Medical): No     Lack of Transportation (Non-Medical): No   Physical Activity: Insufficiently Active (1/31/2024)    Exercise Vital Sign     Days of Exercise  per Week: 3 days     Minutes of Exercise per Session: 40 min   Stress: No Stress Concern Present (1/31/2024)    [employer] - Occupational Stress Questionnaire     Feeling of Stress : Only a little   Housing Stability: Patient Declined (1/31/2024)    Housing Stability Vital Sign     Unable to Pay for Housing in the Last Year: Patient declined     Unstable Housing in the Last Year: Patient declined         Current Outpatient Medications:     cholecalciferol, vitD3,/vit K2 (VITAMIN D3-VITAMIN K2 ORAL), Take 1 capsule by mouth once daily., Disp: , Rfl:     dextroamphetamine-amphetamine (ADDERALL XR) 15 MG 24 hr capsule, Take 1 capsule (15 mg total) by mouth every morning. for 9 days, Disp: 9 capsule, Rfl: 0    ferrous sulfate 325 (65 FE) MG EC tablet, Take 325 mg by mouth once daily., Disp: , Rfl:     hydrocortisone 2.5 % cream, Apply topically 2 (two) times daily., Disp: 28 g, Rfl: 0    omega-3 fatty acids/fish oil (FISH OIL-OMEGA-3 FATTY ACIDS) 300-1,000 mg capsule, Take 1 capsule by mouth once daily., Disp: , Rfl:     omeprazole (PRILOSEC) 40 MG capsule, Take 1 capsule (40 mg total) by mouth before breakfast. Best taken 45-60 minutes before your first protein meal of the day- Breakfast., Disp: 90 capsule, Rfl: 3    [START ON 12/27/2024] dextroamphetamine-amphetamine (ADDERALL XR) 15 MG 24 hr capsule, Take 1 capsule (15 mg total) by mouth every morning., Disp: 30 capsule, Rfl: 0    [START ON 1/27/2025] dextroamphetamine-amphetamine (ADDERALL XR) 15 MG 24 hr capsule, Take 1 capsule (15 mg total) by mouth every morning., Disp: 30 capsule, Rfl: 0    [START ON 2/27/2025] dextroamphetamine-amphetamine (ADDERALL XR) 15 MG 24 hr capsule, Take 1 capsule (15 mg total) by mouth every morning., Disp: 30 capsule, Rfl: 0    guanFACINE (INTUNIV ER) 4 mg Tb24, Take 1 tablet (4 mg total) by mouth once daily., Disp: 30 tablet, Rfl: 0  No current facility-administered medications for this visit.            Objective:      Vitals:    11/27/24 0752   BP: 126/86   Pulse: 75   Temp: 98.3 °F (36.8 °C)       Physical Exam  Constitutional:       Comments: Thin body habitus   HENT:      Head: Normocephalic and atraumatic.   Cardiovascular:      Rate and Rhythm: Normal rate.   Musculoskeletal:         General: Normal range of motion.   Skin:     General: Skin is warm and dry.   Neurological:      General: No focal deficit present.      Mental Status: He is alert and oriented to person, place, and time.          Assessment:       1. Attention deficit hyperactivity disorder (ADHD), combined type    2. SMAS (superior mesenteric artery syndrome)    3. Need for COVID-19 vaccine    4. Annual physical exam          Plan:   1. Attention deficit hyperactivity disorder (ADHD), combined type  Assessment & Plan:  Chronic, reports effects wearing off    - patient would like to try guanfacine to adjunct adderall (1 month supply)  - continue adderall 15mg daily (3 month supply)  - if guanfacine not effective, ok to start Adderall 5mg tab (IR) in the afternoon      Orders:  -     guanFACINE (INTUNIV ER) 4 mg Tb24; Take 1 tablet (4 mg total) by mouth once daily.  Dispense: 30 tablet; Refill: 0  -     dextroamphetamine-amphetamine (ADDERALL XR) 15 MG 24 hr capsule; Take 1 capsule (15 mg total) by mouth every morning.  Dispense: 30 capsule; Refill: 0  -     dextroamphetamine-amphetamine (ADDERALL XR) 15 MG 24 hr capsule; Take 1 capsule (15 mg total) by mouth every morning.  Dispense: 30 capsule; Refill: 0  -     dextroamphetamine-amphetamine (ADDERALL XR) 15 MG 24 hr capsule; Take 1 capsule (15 mg total) by mouth every morning.  Dispense: 30 capsule; Refill: 0    2. SMAS (superior mesenteric artery syndrome)  Assessment & Plan:  On EGD    - Scheduled an enteroscopy to evaluate the jejunal lesion and rule out Crohn's disease.  - Developed a contingent treatment plan based on enteroscopy results: if Crohn's disease is not detected, surgery for  stoma may be scheduled; if Crohn's is found, it will be treated before considering surgery.  - Assessed nutritional status in context of SMA diagnosis.  - Noted the patient has been unable to gain weight independently but has recently gained 3 lbs.  - Discussed importance of calcium intake for bone health, especially in context of SMA-related malnutrition risks.    Orders:  -     Calcitriol; Future; Expected date: 11/27/2024    3. Need for COVID-19 vaccine  -     COVID-19 (Pfizer) 30 mcg/0.3 mL IM vaccine (>/= 13 yo) 0.3 mL    4. Annual physical exam  -     HEPATIC FUNCTION PANEL; Future; Expected date: 11/27/2024  -     Basic Metabolic Panel; Future; Expected date: 11/27/2024  -     Lipid Panel; Future; Expected date: 11/27/2024  -     Hemoglobin A1C; Future; Expected date: 11/27/2024  -     CBC Auto Differential; Future; Expected date: 11/27/2024       HPV VACCINATION:  - Explained the HPV vaccination schedule and importance of completing the series.  - Advised the patient to restart the HPV vaccination series if not completed.  - Recommend completing the three-dose HPV vaccination series, with the second dose in 1-2 months and the third dose 6 months from the first.    MEDICATIONS/SUPPLEMENTS:  - Continued multivitamin supplementation.  - Recommend adding calcium supplements to the patient's regimen.  - Continued vitamin D supplementation.  - Emphasized the importance of vitamin D and calcium for bone health, especially in relation to SMA and malnutrition.  - Recommend adding calcium supplements to the patient's current regimen of vitamin D supplements and multivitamins.  - Joseph to consider taking calcium supplements if dietary intake insufficient.    LABS:  - Labs ordered to be done before next annual visit.    FOLLOW UP:  - Follow up in 4 months.         Follow up in about 4 months (around 3/27/2025) for Annual.    This note was generated with the assistance of ambient listening technology. Verbal consent was  obtained by the patient and accompanying visitor(s) for the recording of patient appointment to facilitate this note. I attest to having reviewed and edited the generated note for accuracy, though some syntax or spelling errors may persist. Please contact the author of this note for any clarification.

## 2024-12-02 ENCOUNTER — HOSPITAL ENCOUNTER (OUTPATIENT)
Facility: HOSPITAL | Age: 29
Discharge: HOME | End: 2024-12-02
Attending: INTERNAL MEDICINE | Admitting: INTERNAL MEDICINE
Payer: MEDICAID

## 2024-12-02 ENCOUNTER — ANESTHESIA (OUTPATIENT)
Dept: ENDOSCOPY | Facility: HOSPITAL | Age: 29
End: 2024-12-02
Payer: MEDICAID

## 2024-12-02 ENCOUNTER — ANESTHESIA EVENT (OUTPATIENT)
Dept: ENDOSCOPY | Facility: HOSPITAL | Age: 29
End: 2024-12-02
Payer: MEDICAID

## 2024-12-02 VITALS
HEART RATE: 83 BPM | TEMPERATURE: 98 F | BODY MASS INDEX: 16.29 KG/M2 | OXYGEN SATURATION: 100 % | RESPIRATION RATE: 20 BRPM | HEIGHT: 69 IN | DIASTOLIC BLOOD PRESSURE: 86 MMHG | WEIGHT: 110 LBS | SYSTOLIC BLOOD PRESSURE: 128 MMHG

## 2024-12-02 DIAGNOSIS — R93.3 ABNORMAL CT SCAN, SMALL BOWEL: Primary | ICD-10-CM

## 2024-12-02 PROCEDURE — 44361 SMALL BOWEL ENDOSCOPY/BIOPSY: CPT | Performed by: INTERNAL MEDICINE

## 2024-12-02 PROCEDURE — 37000009 HC ANESTHESIA EA ADD 15 MINS: Performed by: INTERNAL MEDICINE

## 2024-12-02 PROCEDURE — 27201012 HC FORCEPS, HOT/COLD, DISP: Performed by: INTERNAL MEDICINE

## 2024-12-02 PROCEDURE — 27201030 HC OVERTUBE: Performed by: INTERNAL MEDICINE

## 2024-12-02 PROCEDURE — 44361 SMALL BOWEL ENDOSCOPY/BIOPSY: CPT | Mod: ,,, | Performed by: INTERNAL MEDICINE

## 2024-12-02 PROCEDURE — 63600175 PHARM REV CODE 636 W HCPCS: Performed by: NURSE ANESTHETIST, CERTIFIED REGISTERED

## 2024-12-02 PROCEDURE — D9220A PRA ANESTHESIA: Mod: CRNA,,, | Performed by: NURSE ANESTHETIST, CERTIFIED REGISTERED

## 2024-12-02 PROCEDURE — 25000003 PHARM REV CODE 250: Performed by: NURSE ANESTHETIST, CERTIFIED REGISTERED

## 2024-12-02 PROCEDURE — D9220A PRA ANESTHESIA: Mod: ANES,,, | Performed by: ANESTHESIOLOGY

## 2024-12-02 PROCEDURE — 88305 TISSUE EXAM BY PATHOLOGIST: CPT | Performed by: PATHOLOGY

## 2024-12-02 PROCEDURE — 37000008 HC ANESTHESIA 1ST 15 MINUTES: Performed by: INTERNAL MEDICINE

## 2024-12-02 RX ORDER — LIDOCAINE HYDROCHLORIDE 20 MG/ML
INJECTION INTRAVENOUS
Status: DISCONTINUED | OUTPATIENT
Start: 2024-12-02 | End: 2024-12-02

## 2024-12-02 RX ORDER — MULTIVITAMIN
1 TABLET ORAL DAILY
COMMUNITY

## 2024-12-02 RX ORDER — ONDANSETRON HYDROCHLORIDE 2 MG/ML
4 INJECTION, SOLUTION INTRAVENOUS DAILY PRN
Status: DISCONTINUED | OUTPATIENT
Start: 2024-12-02 | End: 2024-12-02 | Stop reason: HOSPADM

## 2024-12-02 RX ORDER — GLUCAGON 1 MG
1 KIT INJECTION
Status: DISCONTINUED | OUTPATIENT
Start: 2024-12-02 | End: 2024-12-02 | Stop reason: HOSPADM

## 2024-12-02 RX ORDER — ROCURONIUM BROMIDE 10 MG/ML
INJECTION, SOLUTION INTRAVENOUS
Status: DISCONTINUED | OUTPATIENT
Start: 2024-12-02 | End: 2024-12-02

## 2024-12-02 RX ORDER — ONDANSETRON HYDROCHLORIDE 2 MG/ML
INJECTION, SOLUTION INTRAVENOUS
Status: DISCONTINUED | OUTPATIENT
Start: 2024-12-02 | End: 2024-12-02

## 2024-12-02 RX ORDER — MIDAZOLAM HYDROCHLORIDE 1 MG/ML
INJECTION INTRAMUSCULAR; INTRAVENOUS
Status: DISCONTINUED | OUTPATIENT
Start: 2024-12-02 | End: 2024-12-02

## 2024-12-02 RX ORDER — DEXAMETHASONE SODIUM PHOSPHATE 4 MG/ML
INJECTION, SOLUTION INTRA-ARTICULAR; INTRALESIONAL; INTRAMUSCULAR; INTRAVENOUS; SOFT TISSUE
Status: DISCONTINUED | OUTPATIENT
Start: 2024-12-02 | End: 2024-12-02

## 2024-12-02 RX ORDER — PROPOFOL 10 MG/ML
VIAL (ML) INTRAVENOUS
Status: DISCONTINUED | OUTPATIENT
Start: 2024-12-02 | End: 2024-12-02

## 2024-12-02 RX ORDER — SUCCINYLCHOLINE CHLORIDE 20 MG/ML
INJECTION INTRAMUSCULAR; INTRAVENOUS
Status: DISCONTINUED | OUTPATIENT
Start: 2024-12-02 | End: 2024-12-02

## 2024-12-02 RX ORDER — SODIUM CHLORIDE 9 MG/ML
INJECTION, SOLUTION INTRAVENOUS CONTINUOUS
Status: DISCONTINUED | OUTPATIENT
Start: 2024-12-02 | End: 2024-12-02 | Stop reason: HOSPADM

## 2024-12-02 RX ORDER — DEXMEDETOMIDINE HYDROCHLORIDE 100 UG/ML
INJECTION, SOLUTION INTRAVENOUS
Status: DISCONTINUED | OUTPATIENT
Start: 2024-12-02 | End: 2024-12-02

## 2024-12-02 RX ADMIN — DEXMEDETOMIDINE 4 MCG: 100 INJECTION, SOLUTION, CONCENTRATE INTRAVENOUS at 07:12

## 2024-12-02 RX ADMIN — ROCURONIUM BROMIDE 5 MG: 10 INJECTION INTRAVENOUS at 07:12

## 2024-12-02 RX ADMIN — MIDAZOLAM HYDROCHLORIDE 2 MG: 2 INJECTION, SOLUTION INTRAMUSCULAR; INTRAVENOUS at 07:12

## 2024-12-02 RX ADMIN — DEXAMETHASONE SODIUM PHOSPHATE 4 MG: 4 INJECTION, SOLUTION INTRAMUSCULAR; INTRAVENOUS at 07:12

## 2024-12-02 RX ADMIN — SODIUM CHLORIDE: 0.9 INJECTION, SOLUTION INTRAVENOUS at 07:12

## 2024-12-02 RX ADMIN — GLYCOPYRROLATE 0.2 MG: 0.2 INJECTION, SOLUTION INTRAMUSCULAR; INTRAVENOUS at 08:12

## 2024-12-02 RX ADMIN — ONDANSETRON 4 MG: 2 INJECTION INTRAMUSCULAR; INTRAVENOUS at 08:12

## 2024-12-02 RX ADMIN — PROPOFOL 150 MG: 10 INJECTION, EMULSION INTRAVENOUS at 07:12

## 2024-12-02 RX ADMIN — SUCCINYLCHOLINE CHLORIDE 120 MG: 20 INJECTION, SOLUTION INTRAMUSCULAR; INTRAVENOUS at 07:12

## 2024-12-02 RX ADMIN — LIDOCAINE HYDROCHLORIDE 100 MG: 20 INJECTION INTRAVENOUS at 07:12

## 2024-12-02 NOTE — ANESTHESIA PROCEDURE NOTES
Intubation    Date/Time: 12/2/2024 7:47 AM    Performed by: Lenin Leroy CRNA  Authorized by: Marjorie Brown MD    Intubation:     Induction:  Intravenous    Intubated:  Postinduction    Mask Ventilation:  Easy mask    Attempts:  1    Attempted By:  CRNA    Method of Intubation:  Video laryngoscopy    Blade:  Archer 3    Laryngeal View Grade: Grade I - full view of cords      Difficult Airway Encountered?: No      Complications:  None    Airway Device:  Oral endotracheal tube    Airway Device Size:  7.5    Style/Cuff Inflation:  Cuffed (inflated to minimal occlusive pressure)    Tube secured:  23    Secured at:  The lips    Placement Verified By:  Capnometry    Complicating Factors:  None    Findings Post-Intubation:  BS equal bilateral and atraumatic/condition of teeth unchanged

## 2024-12-02 NOTE — TRANSFER OF CARE
"Anesthesia Transfer of Care Note    Patient: Joseph Torrez    Procedure(s) Performed: Procedure(s) (LRB):  ENTEROSCOPY, DOUBLE BALLOON, ANTEGRADE (N/A)    Patient location: [facility]    Anesthesia Type: general    Transport from OR: Transported from OR on 6-10 L/min O2 by face mask with adequate spontaneous ventilation    Post pain: adequate analgesia    Post assessment: no apparent anesthetic complications    Post vital signs: stable    Level of consciousness: sedated and responds to stimulation    Nausea/Vomiting: no nausea/vomiting    Complications: none    Transfer of care protocol was followed      Last vitals: Visit Vitals  BP (!) 153/99   Pulse 84   Temp 36.3 °C (97.3 °F) (Tympanic)   Resp 16   Ht 5' 9" (1.753 m)   Wt 49.9 kg (110 lb)   SpO2 100%   BMI 16.24 kg/m²     "

## 2024-12-02 NOTE — PROVATION PATIENT INSTRUCTIONS
Discharge Summary/Instructions after an Endoscopic Procedure  Patient Name: Joseph Torrez  Patient MRN: [id number]  Patient YOB: 1995  Monday, December 2, 2024  Timbo Smith MD  Dear patient,  As a result of recent federal legislation (The Federal Cures Act), you may   receive lab or pathology results from your procedure in your [facility]   account before your physician is able to contact you. Your physician or   their representative will relay the results to you with their   recommendations at their soonest availability.  Thank you,  RESTRICTIONS:  During your procedure today, you received medications for sedation.  These   medications may affect your judgment, balance and coordination.  Therefore,   for 24 hours, you have the following restrictions:   - DO NOT drive a car, operate machinery, make legal/financial decisions,   sign important papers or drink alcohol.    ACTIVITY:  Today: no heavy lifting, straining or running due to procedural   sedation/anesthesia.  The following day: return to full activity including work.  DIET:  Eat and drink normally unless instructed otherwise.     TREATMENT FOR COMMON SIDE EFFECTS:  - Mild abdominal pain, nausea, belching, bloating or excessive gas:  rest,   eat lightly and use a heating pad.  - Sore Throat: treat with throat lozenges and/or gargle with warm salt   water.  - Because air was used during the procedure, expelling large amounts of air   from your rectum or belching is normal.  - If a bowel prep was taken, you may not have a bowel movement for 1-3 days.    This is normal.  SYMPTOMS TO WATCH FOR AND REPORT TO YOUR PHYSICIAN:  1. Abdominal pain or bloating, other than gas cramps.  2. Chest pain.  3. Back pain.  4. Signs of infection such as: chills or fever occurring within 24 hours   after the procedure.  5. Rectal bleeding, which would show as bright red, maroon, or black stools.   (A tablespoon of blood from the rectum is not serious, especially if    hemorrhoids are present.)  6. Vomiting.  7. Weakness or dizziness.  GO DIRECTLY TO THE NEAREST EMERGENCY ROOM IF YOU HAVE ANY OF THE FOLLOWING:      Difficulty breathing              Chills and/or fever over 101 F   Persistent vomiting and/or vomiting blood   Severe abdominal pain   Severe chest pain   Black, tarry stools   Bleeding- more than one tablespoon   Any other symptom or condition that you feel may need urgent attention  Your doctor recommends these additional instructions:  If any biopsies were taken, your doctors clinic will contact you in 1 to 2   weeks with any results.  - Discharge patient to home.   - Patient has a contact number available for emergencies.  The signs and   symptoms of potential delayed complications were discussed with the   patient.  Return to normal activities tomorrow.  Written discharge   instructions were provided to the patient.   - Resume previous diet.   - Continue present medications.   - Await pathology results.   - Return to referring physician.  For questions, problems or results please call your physician - Timbo Smith MD at Work:  [phone].  [facility] [place], EMERGENCY ROOM PHONE NUMBER: [phone]  IF A COMPLICATION OR EMERGENCY SITUATION ARISES AND YOU ARE UNABLE TO REACH   YOUR PHYSICIAN - GO DIRECTLY TO THE EMERGENCY ROOM.  Timbo Smith MD  12/2/2024 9:04:34 AM  This report has been verified and signed electronically.  Dear patient,  As a result of recent federal legislation (The Federal Cures Act), you may   receive lab or pathology results from your procedure in your [facility]   account before your physician is able to contact you. Your physician or   their representative will relay the results to you with their   recommendations at their soonest availability.  Thank you,  PROVATION

## 2024-12-02 NOTE — INTERVAL H&P NOTE
The patient has been examined and the H&P has been reviewed:    Pre-Procedure H and P Addendum    Patient seen and examined.  History and exam unchanged from prior history and physical.      Procedure: antegrade double-balloon enteroscopy   Indication: Abnormal CT of the small bowel  ASA Class: per anesthesiology  Airway: normal  Neck Mobility: full range of motion  Mallampatti score: per anesthesia  History of anesthesia problems: no  Family history of anesthesia problems: no  Anesthesia Plan: General

## 2024-12-02 NOTE — PLAN OF CARE
Discharge instructions given and explained to patient and family with verbalization of understanding all instructions. PPatients v/s stable, denies n/v and tolerating po, rates pain level tolerable, IV removed, and family at bedside for patient discharge home.

## 2024-12-02 NOTE — ANESTHESIA PREPROCEDURE EVALUATION
12/02/2024  Joseph Torrez is a 29 y.o., male.    Past Medical History:   Diagnosis Date    Panic disorder 02/01/2024     Patient Active Problem List   Diagnosis    Attention deficit hyperactivity disorder (ADHD), combined type    Irritable bowel syndrome with both constipation and diarrhea    History of psoriasis    Weight loss    Need for HPV vaccination    Esophageal dysphagia    Gastroesophageal reflux disease without esophagitis    Body mass index (BMI) less than 19 in adult    SMAS (superior mesenteric artery syndrome)    Elevated bilirubin         Past Surgical History:   Procedure Laterality Date    COLONOSCOPY      COLONOSCOPY N/A 6/20/2024    Procedure: COLONOSCOPY;  Surgeon: Lucho Luo MD;  Location: [facility] ENDO (4TH FLR);  Service: Endoscopy;  Laterality: N/A;  6/14/2024 ref Essence Gaitan NP, urgent double, PEG instr via portal- B  6/15-pre call complete-tb  6/19-LVM for pt to come in [facility]    COLONOSCOPY N/A 9/26/2024    Procedure: COLONOSCOPY;  Surgeon: Lucho Luo MD;  Location: [facility] ENDO (4TH FLR);  Service: Endoscopy;  Laterality: N/A;    ESOPHAGOGASTRODUODENOSCOPY N/A 6/20/2024    Procedure: EGD (ESOPHAGOGASTRODUODENOSCOPY);  Surgeon: Lucho Luo MD;  Location: [facility] ENDO (4TH FLR);  Service: Endoscopy;  Laterality: N/A;    ESOPHAGOGASTRODUODENOSCOPY N/A 9/26/2024    Procedure: EGD (ESOPHAGOGASTRODUODENOSCOPY);  Surgeon: Lucho Luo MD;  Location: [facility] ENDO (4TH FLR);  Service: Endoscopy;  Laterality: N/A;  referred by marely Gaitan. follow up egd colon 3 months, extended miralax prep, instructions mailed to pt portal.  9/17-pt r/s, updated instructions sent to [facility]  9/20-lv for pre nqmz-cc-frit prep previous colon           Pre-op Assessment          Review of Systems  Anesthesia Hx:  No problems with previous Anesthesia   Neg history of prior  surgery.          Denies Family Hx of Anesthesia complications.    Denies Personal Hx of Anesthesia complications. (reports  he is slow to awaken but no other problems)                    Cardiovascular:  Cardiovascular Normal                                              Pulmonary:  Pulmonary Normal    Denies Asthma.    Denies Recent URI.                 Hepatic/GI:     GERD   GERD- learning to control this with smaller meals spaced  out.  Does not have symptoms if he has not eaten.  No symptoms currently.              Neurological:  Neurology Normal                                          Physical Exam  General: Alert, Oriented and Well nourished    Airway:  Mallampati: II   Mouth Opening: Normal  TM Distance: Normal  Neck ROM: Normal ROM    Dental:  Intact    Chest/Lungs:  Normal Respiratory Rate    Heart:  Rate: Normal  Rhythm: Regular Rhythm        Anesthesia Plan  Type of Anesthesia, risks & benefits discussed:    Anesthesia Type: Gen ETT, Gen Natural Airway  Intra-op Monitoring Plan: Standard ASA Monitors  Post Op Pain Control Plan: multimodal analgesia and IV/PO Opioids PRN  Informed Consent: Informed consent signed with the Patient and all parties understand the risks and agree with anesthesia plan.  All questions answered.   ASA Score: 2  Day of Surgery Review of History & Physical: H&P Update referred to the surgeon/provider.    Ready For Surgery From Anesthesia Perspective.     .

## 2024-12-02 NOTE — PROGRESS NOTES
GI MD at bedside explaining procedure findings, states will call sister to explain care or speak with them after procedure.    1005 MD Smith at bedside speaking with patient and sister.  
[phone]

## 2024-12-03 LAB
FINAL PATHOLOGIC DIAGNOSIS: NORMAL
GROSS: NORMAL
Lab: NORMAL

## 2024-12-03 NOTE — ANESTHESIA POSTPROCEDURE EVALUATION
Anesthesia Post Evaluation    Patient: Joseph Torrez    Procedure(s) Performed: Procedure(s) (LRB):  ENTEROSCOPY, DOUBLE BALLOON, ANTEGRADE (N/A)    Final Anesthesia Type: general      Patient location during evaluation: PACU  Patient participation: Yes- Able to Participate  Level of consciousness: awake and alert  Post-procedure vital signs: reviewed and stable  Pain management: adequate  Airway patency: patent    PONV status at discharge: No PONV  Anesthetic complications: no      Cardiovascular status: blood pressure returned to baseline  Respiratory status: unassisted, room air and spontaneous ventilation  Hydration status: euvolemic  Follow-up not needed.              Vitals Value Taken Time   /86 12/02/24 0955   Temp 36.6 °C (97.9 °F) 12/02/24 0855   Pulse 82 12/02/24 0956   Resp 26 12/02/24 0956   SpO2 100 % 12/02/24 0956   Vitals shown include unfiled device data.      No case tracking events are documented in the log.      Pain/Angel Score: Angel Score: 10 (12/2/2024  9:25 AM)

## 2024-12-07 DIAGNOSIS — F90.2 ATTENTION DEFICIT HYPERACTIVITY DISORDER (ADHD), COMBINED TYPE: ICD-10-CM

## 2024-12-07 RX ORDER — GUANFACINE 4 MG/1
1 TABLET, EXTENDED RELEASE ORAL DAILY
Qty: 30 TABLET | Refills: 0 | Status: CANCELLED | OUTPATIENT
Start: 2024-12-07 | End: 2025-01-06

## 2024-12-07 NOTE — TELEPHONE ENCOUNTER
No care due was identified.  [facility] Embedded Care Due Messages. Reference number: [id number].   12/07/2024 8:45:54 AM CST

## 2024-12-08 ENCOUNTER — PATIENT MESSAGE (OUTPATIENT)
Dept: FAMILY MEDICINE | Facility: CLINIC | Age: 29
End: 2024-12-08
Payer: MEDICAID

## 2024-12-09 ENCOUNTER — TELEPHONE (OUTPATIENT)
Dept: FAMILY MEDICINE | Facility: CLINIC | Age: 29
End: 2024-12-09
Payer: MEDICAID

## 2024-12-09 DIAGNOSIS — F90.2 ATTENTION DEFICIT HYPERACTIVITY DISORDER (ADHD), COMBINED TYPE: Primary | ICD-10-CM

## 2024-12-09 RX ORDER — GUANFACINE 2 MG/1
1 TABLET, EXTENDED RELEASE ORAL DAILY
Qty: 30 TABLET | Refills: 0 | Status: SHIPPED | OUTPATIENT
Start: 2024-12-09 | End: 2025-01-08

## 2024-12-09 NOTE — TELEPHONE ENCOUNTER
Patient requesting a lower dose of medication - Intuniv ER 4 mg , due to him experiencing side effects - dizziness and fainting.  Please advise.

## 2024-12-09 NOTE — TELEPHONE ENCOUNTER
----- Message from Bertha sent at 12/9/2024 10:27 AM CST -----  .Type: Patient Call Back    Who called: Self     What is the request in detail: Would like a lower dosage of the guanFACINE (INTUNIV ER) 4 mg Tb24, stated it's making him dizzy, he fainted but didn't hit his head. Ask that the nurse give him a call     Can the clinic reply by [facility]? No     Would the patient rather a call back or a response via [facility]? Call Back     Best call back number: .[phone] (home)       Additional Information:

## 2024-12-09 NOTE — TELEPHONE ENCOUNTER
LOV 11/27/2024  Last refill 11/27/2024    Patient requesting a lower dose of the medication - Intuniv ER 4 mg tablets.  Per patient, the side effects have been too much and he wanted to see if it would be better on a lower dose.  Please advise.

## 2024-12-11 ENCOUNTER — HOSPITAL ENCOUNTER (EMERGENCY)
Facility: HOSPITAL | Age: 29
Discharge: HOME | End: 2024-12-11
Attending: STUDENT IN AN ORGANIZED HEALTH CARE EDUCATION/TRAINING PROGRAM
Payer: MEDICAID

## 2024-12-11 VITALS
RESPIRATION RATE: 17 BRPM | DIASTOLIC BLOOD PRESSURE: 102 MMHG | HEART RATE: 67 BPM | OXYGEN SATURATION: 100 % | BODY MASS INDEX: 16.67 KG/M2 | HEIGHT: 68 IN | TEMPERATURE: 98 F | SYSTOLIC BLOOD PRESSURE: 158 MMHG | WEIGHT: 110 LBS

## 2024-12-11 DIAGNOSIS — R51.9 NONINTRACTABLE HEADACHE, UNSPECIFIED CHRONICITY PATTERN, UNSPECIFIED HEADACHE TYPE: Primary | ICD-10-CM

## 2024-12-11 DIAGNOSIS — R07.9 CHEST PAIN: ICD-10-CM

## 2024-12-11 LAB
ALBUMIN SERPL BCP-MCNC: 4.3 G/DL (ref 3.5–5.2)
ALP SERPL-CCNC: 89 U/L (ref 40–150)
ALT SERPL W/O P-5'-P-CCNC: 21 U/L (ref 10–44)
ANION GAP SERPL CALC-SCNC: 11 MMOL/L (ref 8–16)
AST SERPL-CCNC: 19 U/L (ref 10–40)
BASOPHILS # BLD AUTO: 0.11 K/UL (ref 0–0.2)
BASOPHILS NFR BLD: 1 % (ref 0–1.9)
BILIRUB SERPL-MCNC: 1.9 MG/DL (ref 0.1–1)
BUN SERPL-MCNC: 13 MG/DL (ref 6–20)
CALCIUM SERPL-MCNC: 10 MG/DL (ref 8.7–10.5)
CHLORIDE SERPL-SCNC: 105 MMOL/L (ref 95–110)
CO2 SERPL-SCNC: 27 MMOL/L (ref 23–29)
CREAT SERPL-MCNC: 0.9 MG/DL (ref 0.5–1.4)
DIFFERENTIAL METHOD BLD: ABNORMAL
EOSINOPHIL # BLD AUTO: 0.2 K/UL (ref 0–0.5)
EOSINOPHIL NFR BLD: 1.9 % (ref 0–8)
ERYTHROCYTE [DISTWIDTH] IN BLOOD BY AUTOMATED COUNT: 12.6 % (ref 11.5–14.5)
EST. GFR  (NO RACE VARIABLE): >60 ML/MIN/1.73 M^2
GLUCOSE SERPL-MCNC: 95 MG/DL (ref 70–110)
HCT VFR BLD AUTO: 44.3 % (ref 40–54)
HGB BLD-MCNC: 16.2 G/DL (ref 14–18)
IMM GRANULOCYTES # BLD AUTO: 0.03 K/UL (ref 0–0.04)
IMM GRANULOCYTES NFR BLD AUTO: 0.3 % (ref 0–0.5)
LYMPHOCYTES # BLD AUTO: 2.8 K/UL (ref 1–4.8)
LYMPHOCYTES NFR BLD: 24.6 % (ref 18–48)
MCH RBC QN AUTO: 31.6 PG (ref 27–31)
MCHC RBC AUTO-ENTMCNC: 36.6 G/DL (ref 32–36)
MCV RBC AUTO: 86 FL (ref 82–98)
MONOCYTES # BLD AUTO: 0.9 K/UL (ref 0.3–1)
MONOCYTES NFR BLD: 7.6 % (ref 4–15)
NEUTROPHILS # BLD AUTO: 7.4 K/UL (ref 1.8–7.7)
NEUTROPHILS NFR BLD: 64.6 % (ref 38–73)
NRBC BLD-RTO: 0 /100 WBC
OHS QRS DURATION: 98 MS
OHS QTC CALCULATION: 401 MS
PLATELET # BLD AUTO: 306 K/UL (ref 150–450)
PMV BLD AUTO: 11 FL (ref 9.2–12.9)
POTASSIUM SERPL-SCNC: 4.3 MMOL/L (ref 3.5–5.1)
PROT SERPL-MCNC: 7.8 G/DL (ref 6–8.4)
RBC # BLD AUTO: 5.13 M/UL (ref 4.6–6.2)
SODIUM SERPL-SCNC: 143 MMOL/L (ref 136–145)
TROPONIN I SERPL DL<=0.01 NG/ML-MCNC: <3 NG/L (ref 0–35)
WBC # BLD AUTO: 11.49 K/UL (ref 3.9–12.7)

## 2024-12-11 PROCEDURE — 84484 ASSAY OF TROPONIN QUANT: CPT | Performed by: STUDENT IN AN ORGANIZED HEALTH CARE EDUCATION/TRAINING PROGRAM

## 2024-12-11 PROCEDURE — 85025 COMPLETE CBC W/AUTO DIFF WBC: CPT | Performed by: STUDENT IN AN ORGANIZED HEALTH CARE EDUCATION/TRAINING PROGRAM

## 2024-12-11 PROCEDURE — 93010 ELECTROCARDIOGRAM REPORT: CPT | Mod: ,,, | Performed by: INTERNAL MEDICINE

## 2024-12-11 PROCEDURE — 80053 COMPREHEN METABOLIC PANEL: CPT | Performed by: STUDENT IN AN ORGANIZED HEALTH CARE EDUCATION/TRAINING PROGRAM

## 2024-12-11 PROCEDURE — 25000003 PHARM REV CODE 250: Performed by: STUDENT IN AN ORGANIZED HEALTH CARE EDUCATION/TRAINING PROGRAM

## 2024-12-11 PROCEDURE — 99285 EMERGENCY DEPT VISIT HI MDM: CPT | Mod: 25

## 2024-12-11 PROCEDURE — 93005 ELECTROCARDIOGRAM TRACING: CPT

## 2024-12-11 RX ORDER — ALUMINUM HYDROXIDE, MAGNESIUM HYDROXIDE, AND SIMETHICONE 1200; 120; 1200 MG/30ML; MG/30ML; MG/30ML
30 SUSPENSION ORAL ONCE
Status: COMPLETED | OUTPATIENT
Start: 2024-12-11 | End: 2024-12-11

## 2024-12-11 RX ORDER — IBUPROFEN 600 MG/1
600 TABLET ORAL
Status: COMPLETED | OUTPATIENT
Start: 2024-12-11 | End: 2024-12-11

## 2024-12-11 RX ORDER — ACETAMINOPHEN 500 MG
1000 TABLET ORAL
Status: COMPLETED | OUTPATIENT
Start: 2024-12-11 | End: 2024-12-11

## 2024-12-11 RX ORDER — LIDOCAINE HYDROCHLORIDE 20 MG/ML
15 SOLUTION OROPHARYNGEAL ONCE
Status: COMPLETED | OUTPATIENT
Start: 2024-12-11 | End: 2024-12-11

## 2024-12-11 RX ADMIN — IBUPROFEN 600 MG: 600 TABLET, FILM COATED ORAL at 01:12

## 2024-12-11 RX ADMIN — ALUMINUM HYDROXIDE, MAGNESIUM HYDROXIDE, AND SIMETHICONE 30 ML: 200; 200; 20 SUSPENSION ORAL at 01:12

## 2024-12-11 RX ADMIN — ACETAMINOPHEN 1000 MG: 500 TABLET ORAL at 01:12

## 2024-12-11 RX ADMIN — LIDOCAINE HYDROCHLORIDE 15 ML: 20 SOLUTION ORAL at 01:12

## 2024-12-11 NOTE — DISCHARGE INSTRUCTIONS
You were seen in the Emergency Department today for chest pain and headache. We performed blood work, an EKG, and a chest x-ray.  The blood work and EKG did not show signs of stress or strain in the heart, chest x-ray did not show any signs of the collapsed lung, pneumonia, or other acute emergent issues.    Your blood pressure was a little high today.  Please continue to work with your primary doctor to discuss how you take your guanfacine and plans to taper it as needed.    For any headaches going forward you may consider taking Tylenol 1000 mg every 6 hours (no more than 4000 mg per 24 hour period) along with ibuprofen 600 mg every 6 hours.    Please follow up with your primary doctor in the next 3-5 days for a repeat evaluation and further management.    Please return to the Emergency Department immediately for any continued or worsening symptoms such as severe headache, feeling off balance, severe chest pain, trouble breathing, if you pass out.

## 2024-12-11 NOTE — ED PROVIDER NOTES
Encounter Date: 12/11/2024       History     Chief Complaint   Patient presents with    Chest Pain     Intermittent chest pain and headache.     This is a 29-year-old male with a history of ADHD currently taking guanfacine, panic disorder who presents to the ED for several hours of chest pain and frontal throbbing headache.  Patient states that he had been attempting to wean his guanfacine outpatient with his primary doctor there were some issues in the ended up stopping it completely.  He is worried that he may be having some rebound hypertension so he took a dose of his help propranolol that he uses for performing some anxiety but then started to have some burning chest pain so he came to the ED. he denies any shortness of breath, leg swelling, fevers, chills.  No other known health issues.        Review of patient's allergies indicates:  No Known Allergies  Past Medical History:   Diagnosis Date    Panic disorder 02/01/2024     Past Surgical History:   Procedure Laterality Date    COLONOSCOPY      COLONOSCOPY N/A 6/20/2024    Procedure: COLONOSCOPY;  Surgeon: Lucho Luo MD;  Location: [facility] (4TH FLR);  Service: Endoscopy;  Laterality: N/A;  6/14/2024 ref Essence Gaitan NP, urgent double, PEG instr via portal- [facility]  6/15-pre call complete-tb  6/19-LVM for pt to come in earlier[facility]    COLONOSCOPY N/A 9/26/2024    Procedure: COLONOSCOPY;  Surgeon: Lucho Luo MD;  Location: [facility] (4TH FLR);  Service: Endoscopy;  Laterality: N/A;    ENTEROSCOPY, DOUBLE BALLOON, ANTEGRADE N/A 12/2/2024    Procedure: ENTEROSCOPY, DOUBLE BALLOON, ANTEGRADE;  Surgeon: Timbo Smith MD;  Location: [facility] (2ND FLR);  Service: Endoscopy;  Laterality: N/A;  10/26 portal-earlier procedure date offered and declined-tt  11/21-pre call complete-tb    ESOPHAGOGASTRODUODENOSCOPY N/A 6/20/2024    Procedure: EGD (ESOPHAGOGASTRODUODENOSCOPY);  Surgeon: Lucho Luo MD;  Location: [facility] (4TH FLR);  Service:  Endoscopy;  Laterality: N/A;    ESOPHAGOGASTRODUODENOSCOPY N/A 9/26/2024    Procedure: EGD (ESOPHAGOGASTRODUODENOSCOPY);  Surgeon: Lucho Luo MD;  Location: [facility] ([address] [place] [address]);  Service: Endoscopy;  Laterality: N/A;  referred by marely Gaitan. follow up egd colon 3 months, extended miralax prep, instructions mailed to pt portal.cf  9/17-pt r/s, updated instructions sent to portal-[facility]  9/20-lvm for pre wopl-gv-ygup prep previous colon     Family History   Problem Relation Name Age of Onset    Cancer Mother Gaby Clay     Stroke Paternal Grandfather Abi Rivas     Learning disabilities Brother Serge Samayoa      Social History     Tobacco Use    Smoking status: Never     Passive exposure: Never    Smokeless tobacco: Never   Substance Use Topics    Alcohol use: Not Currently    Drug use: Never     Review of Systems   Constitutional:  Negative for fever.   HENT:  Negative for sore throat.    Respiratory:  Negative for shortness of breath.    Cardiovascular:  Positive for chest pain.   Gastrointestinal:  Negative for abdominal pain, nausea and vomiting.   Musculoskeletal:  Negative for joint swelling.   Skin:  Negative for rash.   Neurological:  Positive for headaches.       Physical Exam     Initial Vitals [12/11/24 0006]   BP Pulse Resp Temp SpO2   (!) 161/100 79 16 97.4 °F (36.3 °C) 97 %      MAP       --         Physical Exam    Vitals reviewed.  Constitutional: He appears well-developed and well-nourished.   HENT:   Head: Normocephalic and atraumatic.   Eyes: EOM are normal. Pupils are equal, round, and reactive to light.   Neck: Neck supple.   Normal range of motion.  Cardiovascular:  Normal rate and regular rhythm.           Pulmonary/Chest: Breath sounds normal. No respiratory distress.   Abdominal: Abdomen is soft. He exhibits no distension and no mass. There is no abdominal tenderness. There is no rebound and no guarding.   Musculoskeletal:         General: No tenderness or edema. Normal  range of motion.      Cervical back: Normal range of motion and neck supple.     Neurological: He is alert and oriented to person, place, and time. No cranial nerve deficit.   Skin: Skin is warm and dry.         ED Course   Procedures  Labs Reviewed   COMPREHENSIVE METABOLIC PANEL - Abnormal       Result Value    Sodium 143      Potassium 4.3      Chloride 105      CO2 27      Glucose 95      BUN 13      Creatinine 0.9      Calcium 10.0      Total Protein 7.8      Albumin 4.3      Total Bilirubin 1.9 (*)     Alkaline Phosphatase 89      AST 19      ALT 21      eGFR >60.0      Anion Gap 11     CBC W/ AUTO DIFFERENTIAL - Abnormal    WBC 11.49      RBC 5.13      Hemoglobin 16.2      Hematocrit 44.3      MCV 86      MCH 31.6 (*)     MCHC 36.6 (*)     RDW 12.6      Platelets 306      MPV 11.0      Immature Granulocytes 0.3      Gran # (ANC) 7.4      Immature Grans (Abs) 0.03      Lymph # 2.8      Mono # 0.9      Eos # 0.2      Baso # 0.11      nRBC 0      Gran % 64.6      Lymph % 24.6      Mono % 7.6      Eosinophil % 1.9      Basophil % 1.0      Differential Method Automated     TROPONIN I HIGH SENSITIVITY    Troponin I High Sensitivity <3       EKG Readings: (Independently Interpreted)   EKG at 12:08 a.m. shows sinus rhythm rate 65.  Normal axis.  Normal intervals.  No acute ST or T-wave abnormalities.   Dictation #1  MRN:[id number]  CSN:[id number]     Imaging Results              X-Ray Chest 1 View (Final result)  Result time 12/11/24 02:52:55      Final result by Matt Porter MD (12/11/24 02:52:55)                   Impression:      No acute findings in the chest.      Electronically signed by: Matt Porter MD  Date:    12/11/2024  Time:    02:52               Narrative:    EXAMINATION:  XR CHEST 1 VIEW    CLINICAL HISTORY:  Chest pain, unspecified    TECHNIQUE:  Single frontal view of the chest was performed.    COMPARISON:  None    FINDINGS:  No consolidation, pleural effusion or  pneumothorax.    Cardiomediastinal silhouette is unremarkable.                                       Medications   aluminum-magnesium hydroxide-simethicone 200-200-20 mg/5 mL suspension 30 mL (30 mLs Oral Given 12/11/24 0135)     And   LIDOcaine viscous HCl 2% oral solution 15 mL (15 mLs Oral Given 12/11/24 0135)   acetaminophen tablet 1,000 mg (1,000 mg Oral Given 12/11/24 0135)   ibuprofen tablet 600 mg (600 mg Oral Given 12/11/24 0136)     Medical Decision Making   29-year-old male with a history of ADHD currently taking guanfacine, panic disorder who presents to the ED for several hours of chest pain and frontal throbbing headache.  His initial blood pressure is 161/100.  Normal pulse, respirations, and SpO2.  Differential diagnosis includes ACS, pneumothorax, less likely DVT/PE as he is PERC negative and low risk per [place].  Consideration for rebound hypertension due to guanfacine withdrawal, tension headache, feel unlikely to represent a SAH or intracranial hemorrhage given normal vital signs, lack of severe symptoms, no neurologic deficits.  Patient comfortable with plans to obtain basic blood work, EKG, chest x-ray, and we will attempt to treat symptomatically.    Amount and/or Complexity of Data Reviewed  Labs: ordered.  Radiology: ordered.    Risk  OTC drugs.  Prescription drug management.               ED Course as of 12/11/24 0339   Wed Dec 11, 2024   0337 BILIRUBIN TOTAL(!): 1.9  Previously elevated. [MB]   0337 I have reviewed and independently interpreted all available laboratory and imaging studies.  Labs wholly unremarkable.  Troponin undetectable.  Chest x-ray negative.  Re-evaluated the patient, he is resting comfortably, still reports mild throbbing in the forehead area. Discussed negative workup as above.  He is comfortable with plan for discharge home and outpatient follow up with continued symptomatic treatment as needed.  Encouraged close follow up with the PCP to discuss dosing of his  guanfacine and tapering if he wishes to discontinue this medication    Strict return precautions were discussed, patient verbalized understanding and agreed with plan.  Patient discharged home. [MB]      ED Course User Index  [MB] Erasmo Lerma MD                           Clinical Impression:  Final diagnoses:  [R07.9] Chest pain  [R51.9] Nonintractable headache, unspecified chronicity pattern, unspecified headache type (Primary)          ED Disposition Condition    Discharge Stable          ED Prescriptions    None       Follow-up Information       Follow up With Specialties Details Why Contact Info    Barbara Maya MD Internal Medicine Schedule an appointment as soon as possible for a visit in 3 days For a follow up appointment [address] [place] [address]  [phone]               Erasmo Lerma MD  12/11/24 0335

## 2024-12-11 NOTE — ED NOTES
"Joseph Torrez, a 29 y.o. male presents to the ED w/ complaint of chest pain. Pt reports having chest pain intermittently as well as having a headache and feeling like he was "falling while trying to sleep." Pt reports having recently been taking guanfacine, but stopped about a week ago.     Triage note:  Chief Complaint   Patient presents with    Chest Pain     Intermittent chest pain and headache.     Review of patient's allergies indicates:  No Known Allergies  Past Medical History:   Diagnosis Date    Panic disorder 02/01/2024      "

## 2024-12-12 ENCOUNTER — PATIENT MESSAGE (OUTPATIENT)
Dept: GASTROENTEROLOGY | Facility: CLINIC | Age: 29
End: 2024-12-12
Payer: MEDICAID

## 2024-12-19 DIAGNOSIS — K50.00 CROHN'S DISEASE OF SMALL INTESTINE WITHOUT COMPLICATION: Primary | ICD-10-CM

## 2024-12-19 NOTE — PROGRESS NOTES
Discussed patient's case at our IBD conference, given jejunal inflammation on multiple CT scans over time, aphthous ulcers in the terminal ileum and 2nd portion of the duodenum picture is most consistent with Crohn's disease.  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  Called the patient and discussed this over the phone.  Will plan on starting a biologic, I favor skyrizi/Stelara vs adalimumab/azathoprine. He also has psoriasis, both those options are approved for psoriasis as well. The patient will think about it and let me know. Will set up a clinic appt to discuss.  Will get pre-biologic labs.

## 2024-12-27 ENCOUNTER — TELEPHONE (OUTPATIENT)
Dept: GASTROENTEROLOGY | Facility: CLINIC | Age: 29
End: 2024-12-27
Payer: MEDICAID

## 2024-12-27 NOTE — TELEPHONE ENCOUNTER
----- Message from Nicole Kaba MD sent at 12/24/2024 11:25 AM CST -----  Regarding: RE: biologic options  I just spoke to him, he wants to go ahead with the stelara so lets start the process on that.  ----- Message -----  From: Janny Kessler, PharmD  Sent: 12/24/2024  10:30 AM CST  To: Nicole Kaba MD; [facility] Ibd Pharmacist  Subject: RE: biologic options                             Medicaid will likely prefer Humira, Avsola, and stelara over skyrizi. Being that skyrizi is newer we will likely get pushback especially if he is biologic naive. Do you want us to try Stelara first for him? I think we have a good chance of getting it approved. If that becomes impossible then we can try a TNF. Let us know if that's how you want to proceed and we we can reach out to him and start the process.    Thank you,  Janny  ----- Message -----  From: Nicole Kaba MD  Sent: 12/20/2024  11:00 AM CST  To: [facility] Pharmacist  Subject: biologic options                                 Good morning,  This is the patient with Crohn's I presented at conference. I'm between humira and skyrizi for him. What's the deal with medicaid? Do I have to try an anti-TNF first or can I try an anti-interleukin? I think stelara would be fine too, his disease is not super severe. He also has psoriasis so either option would work for him.

## 2024-12-27 NOTE — TELEPHONE ENCOUNTER
Called pt to discuss new treatment. Unable to reach. LVM and sent portal message    - 29 YOM with Crohn's disease and a complex GI history   - recent scope from 9/2024 showed aphthous ulcers in the terminal ileum and 2nd portion of the duodenum picture is most consistent with Crohn's disease   - he has lost a significant amount of weight thought to be due to SMA syndrome   - plan to start treatment of the inflammation due to Crohn's disease which should help with weight gain  - therapy plan for stelara IV sent to be sent to [facility], stelara SC sent to be sent to [facility]   - labs ordered but not completed yet  - baseline labs required prior to starting stelara   - case discussed with Dr. Kaba

## 2024-12-30 PROBLEM — K50.012 CROHN'S DISEASE OF SMALL INTESTINE WITH INTESTINAL OBSTRUCTION: Chronic | Status: ACTIVE | Noted: 2024-12-30

## 2024-12-30 RX ORDER — GUANFACINE 1 MG/1
1 TABLET ORAL NIGHTLY
Qty: 30 TABLET | Refills: 0 | Status: SHIPPED | OUTPATIENT
Start: 2024-12-30

## 2024-12-31 ENCOUNTER — LAB VISIT (OUTPATIENT)
Dept: LAB | Facility: HOSPITAL | Age: 29
End: 2024-12-31
Attending: STUDENT IN AN ORGANIZED HEALTH CARE EDUCATION/TRAINING PROGRAM
Payer: MEDICAID

## 2024-12-31 DIAGNOSIS — K50.00 CROHN'S DISEASE OF SMALL INTESTINE WITHOUT COMPLICATION: ICD-10-CM

## 2024-12-31 LAB
25(OH)D3+25(OH)D2 SERPL-MCNC: 28 NG/ML (ref 30–96)
ALBUMIN SERPL BCP-MCNC: 4.3 G/DL (ref 3.5–5.2)
ALP SERPL-CCNC: 77 U/L (ref 40–150)
ALT SERPL W/O P-5'-P-CCNC: 18 U/L (ref 10–44)
ANION GAP SERPL CALC-SCNC: 9 MMOL/L (ref 8–16)
AST SERPL-CCNC: 22 U/L (ref 10–40)
BASOPHILS # BLD AUTO: 0.05 K/UL (ref 0–0.2)
BASOPHILS NFR BLD: 0.8 % (ref 0–1.9)
BILIRUB SERPL-MCNC: 1.7 MG/DL (ref 0.1–1)
BUN SERPL-MCNC: 8 MG/DL (ref 6–20)
CALCIUM SERPL-MCNC: 9.5 MG/DL (ref 8.7–10.5)
CHLORIDE SERPL-SCNC: 107 MMOL/L (ref 95–110)
CO2 SERPL-SCNC: 27 MMOL/L (ref 23–29)
CREAT SERPL-MCNC: 0.8 MG/DL (ref 0.5–1.4)
CRP SERPL-MCNC: 0.5 MG/L (ref 0–8.2)
DIFFERENTIAL METHOD BLD: ABNORMAL
EOSINOPHIL # BLD AUTO: 0.2 K/UL (ref 0–0.5)
EOSINOPHIL NFR BLD: 2.5 % (ref 0–8)
ERYTHROCYTE [DISTWIDTH] IN BLOOD BY AUTOMATED COUNT: 12.8 % (ref 11.5–14.5)
EST. GFR  (NO RACE VARIABLE): >60 ML/MIN/1.73 M^2
GLUCOSE SERPL-MCNC: 92 MG/DL (ref 70–110)
HAV IGG SER QL IA: REACTIVE
HBV CORE AB SERPL QL IA: NORMAL
HBV SURFACE AG SERPL QL IA: NORMAL
HCT VFR BLD AUTO: 46.3 % (ref 40–54)
HCV AB SERPL QL IA: NORMAL
HGB BLD-MCNC: 16.4 G/DL (ref 14–18)
HIV 1+2 AB+HIV1 P24 AG SERPL QL IA: NORMAL
IGA SERPL-MCNC: 202 MG/DL (ref 40–350)
IMM GRANULOCYTES # BLD AUTO: 0.02 K/UL (ref 0–0.04)
IMM GRANULOCYTES NFR BLD AUTO: 0.3 % (ref 0–0.5)
LYMPHOCYTES # BLD AUTO: 2.2 K/UL (ref 1–4.8)
LYMPHOCYTES NFR BLD: 34 % (ref 18–48)
MCH RBC QN AUTO: 31.1 PG (ref 27–31)
MCHC RBC AUTO-ENTMCNC: 35.4 G/DL (ref 32–36)
MCV RBC AUTO: 88 FL (ref 82–98)
MONOCYTES # BLD AUTO: 0.4 K/UL (ref 0.3–1)
MONOCYTES NFR BLD: 6.1 % (ref 4–15)
MUMPS IGG INTERPRETATION: POSITIVE
MUMPS IGG SCREEN: 207 AU/ML
NEUTROPHILS # BLD AUTO: 3.6 K/UL (ref 1.8–7.7)
NEUTROPHILS NFR BLD: 56.3 % (ref 38–73)
NRBC BLD-RTO: 0 /100 WBC
PLATELET # BLD AUTO: 281 K/UL (ref 150–450)
PMV BLD AUTO: 10.8 FL (ref 9.2–12.9)
POTASSIUM SERPL-SCNC: 3.8 MMOL/L (ref 3.5–5.1)
PROT SERPL-MCNC: 7.9 G/DL (ref 6–8.4)
RBC # BLD AUTO: 5.28 M/UL (ref 4.6–6.2)
RUBEOLA IGG ANTIBODY: >300 AU/ML
RUBEOLA INTERPRETATION: POSITIVE
SODIUM SERPL-SCNC: 143 MMOL/L (ref 136–145)
VARICELLA INTERPRETATION: POSITIVE
VARICELLA ZOSTER IGG: 4.8 S/CO
VIT B12 SERPL-MCNC: 433 PG/ML (ref 210–950)
WBC # BLD AUTO: 6.41 K/UL (ref 3.9–12.7)

## 2024-12-31 PROCEDURE — 86787 VARICELLA-ZOSTER ANTIBODY: CPT | Performed by: STUDENT IN AN ORGANIZED HEALTH CARE EDUCATION/TRAINING PROGRAM

## 2024-12-31 PROCEDURE — 86480 TB TEST CELL IMMUN MEASURE: CPT | Performed by: STUDENT IN AN ORGANIZED HEALTH CARE EDUCATION/TRAINING PROGRAM

## 2024-12-31 PROCEDURE — 86790 VIRUS ANTIBODY NOS: CPT | Performed by: STUDENT IN AN ORGANIZED HEALTH CARE EDUCATION/TRAINING PROGRAM

## 2024-12-31 PROCEDURE — 82306 VITAMIN D 25 HYDROXY: CPT | Performed by: STUDENT IN AN ORGANIZED HEALTH CARE EDUCATION/TRAINING PROGRAM

## 2024-12-31 PROCEDURE — 86762 RUBELLA ANTIBODY: CPT | Performed by: STUDENT IN AN ORGANIZED HEALTH CARE EDUCATION/TRAINING PROGRAM

## 2024-12-31 PROCEDURE — 86765 RUBEOLA ANTIBODY: CPT | Performed by: STUDENT IN AN ORGANIZED HEALTH CARE EDUCATION/TRAINING PROGRAM

## 2024-12-31 PROCEDURE — 86704 HEP B CORE ANTIBODY TOTAL: CPT | Performed by: STUDENT IN AN ORGANIZED HEALTH CARE EDUCATION/TRAINING PROGRAM

## 2024-12-31 PROCEDURE — 87340 HEPATITIS B SURFACE AG IA: CPT | Performed by: STUDENT IN AN ORGANIZED HEALTH CARE EDUCATION/TRAINING PROGRAM

## 2024-12-31 PROCEDURE — 86735 MUMPS ANTIBODY: CPT | Performed by: STUDENT IN AN ORGANIZED HEALTH CARE EDUCATION/TRAINING PROGRAM

## 2024-12-31 PROCEDURE — 80053 COMPREHEN METABOLIC PANEL: CPT | Performed by: STUDENT IN AN ORGANIZED HEALTH CARE EDUCATION/TRAINING PROGRAM

## 2024-12-31 PROCEDURE — 86706 HEP B SURFACE ANTIBODY: CPT | Performed by: STUDENT IN AN ORGANIZED HEALTH CARE EDUCATION/TRAINING PROGRAM

## 2024-12-31 PROCEDURE — 86364 TISS TRNSGLTMNASE EA IG CLAS: CPT | Performed by: STUDENT IN AN ORGANIZED HEALTH CARE EDUCATION/TRAINING PROGRAM

## 2024-12-31 PROCEDURE — 86140 C-REACTIVE PROTEIN: CPT | Performed by: STUDENT IN AN ORGANIZED HEALTH CARE EDUCATION/TRAINING PROGRAM

## 2024-12-31 PROCEDURE — 85025 COMPLETE CBC W/AUTO DIFF WBC: CPT | Performed by: STUDENT IN AN ORGANIZED HEALTH CARE EDUCATION/TRAINING PROGRAM

## 2024-12-31 PROCEDURE — 82607 VITAMIN B-12: CPT | Performed by: STUDENT IN AN ORGANIZED HEALTH CARE EDUCATION/TRAINING PROGRAM

## 2024-12-31 PROCEDURE — 87389 HIV-1 AG W/HIV-1&-2 AB AG IA: CPT | Performed by: STUDENT IN AN ORGANIZED HEALTH CARE EDUCATION/TRAINING PROGRAM

## 2024-12-31 PROCEDURE — 84433 ASY THIOPURIN S-MTHYLTRNSFRS: CPT | Performed by: STUDENT IN AN ORGANIZED HEALTH CARE EDUCATION/TRAINING PROGRAM

## 2024-12-31 PROCEDURE — 36415 COLL VENOUS BLD VENIPUNCTURE: CPT | Performed by: STUDENT IN AN ORGANIZED HEALTH CARE EDUCATION/TRAINING PROGRAM

## 2024-12-31 PROCEDURE — 82784 ASSAY IGA/IGD/IGG/IGM EACH: CPT | Performed by: STUDENT IN AN ORGANIZED HEALTH CARE EDUCATION/TRAINING PROGRAM

## 2024-12-31 PROCEDURE — 86803 HEPATITIS C AB TEST: CPT | Performed by: STUDENT IN AN ORGANIZED HEALTH CARE EDUCATION/TRAINING PROGRAM

## 2024-12-31 PROCEDURE — 86671 FUNGUS NES ANTIBODY: CPT | Performed by: STUDENT IN AN ORGANIZED HEALTH CARE EDUCATION/TRAINING PROGRAM

## 2025-01-02 LAB
ANCA AB PATTERN SER IF-IMP: ABNORMAL
ANCA IGG TITR SER IF: ABNORMAL {TITER}
BAKER'S YEAST IGA QN IA: 45.5 UNITS (ref 0–24.9)
BAKER'S YEAST IGG QN IA: 44.6 UNITS (ref 0–24.9)
GAMMA INTERFERON BACKGROUND BLD IA-ACNC: 0.07 IU/ML
M TB IFN-G CD4+ BCKGRND COR BLD-ACNC: -0.03 IU/ML
M TB IFN-G CD4+ BCKGRND COR BLD-ACNC: -0.03 IU/ML
MITOGEN IGNF BCKGRD COR BLD-ACNC: 9.93 IU/ML
PATHOLOGY STUDY: ABNORMAL
RUBV IGG SER-ACNC: 70.3 IU/ML
RUBV IGG SER-IMP: REACTIVE
TB GOLD PLUS: NEGATIVE
TEST PERFORMANCE INFO SPEC: ABNORMAL

## 2025-01-05 LAB
HBV SURFACE AB SER QL IA: POSITIVE
HBV SURFACE AB SERPL IA-ACNC: 780 MIU/ML

## 2025-01-06 ENCOUNTER — PATIENT MESSAGE (OUTPATIENT)
Dept: GASTROENTEROLOGY | Facility: CLINIC | Age: 30
End: 2025-01-06
Payer: MEDICAID

## 2025-01-06 LAB
6-METHYLMERCAPTOPURINE RIBOSIDE: 5.42 NMOL/ML/H (ref 5.04–9.57)
6-METHYLMERCAPTOPURINE: 3.91 NMOL/ML/H (ref 3–6.66)
6-METHYLTHIOGUANINE RIBOSIDE: 2.91 NMOL/ML/H (ref 2.7–5.84)
TPMT INTERPRETATION: NORMAL
TPMT REVIEWED BY: NORMAL
TTG IGA SER-ACNC: 0.5 U/ML

## 2025-01-06 RX ORDER — ERGOCALCIFEROL 1.25 MG/1
CAPSULE ORAL
Qty: 18 CAPSULE | Refills: 0 | Status: SHIPPED | OUTPATIENT
Start: 2025-01-06 | End: 2026-01-01

## 2025-01-13 ENCOUNTER — PATIENT MESSAGE (OUTPATIENT)
Dept: GASTROENTEROLOGY | Facility: CLINIC | Age: 30
End: 2025-01-13
Payer: MEDICAID

## 2025-01-15 ENCOUNTER — TELEPHONE (OUTPATIENT)
Dept: GASTROENTEROLOGY | Facility: CLINIC | Age: 30
End: 2025-01-15
Payer: MEDICAID

## 2025-01-15 NOTE — TELEPHONE ENCOUNTER
Spoke with pt on 1/13/25  - Reviewed risks of stelara infusion including hypersensitivity. Patient verbalized understanding and consented to Stelara IV infusion by spelling his/her first and last name and stating his/her date of birth  - 2 witnesses were present during the verbal consent: Steff Sanchez, PharmD + Juve PatelD   - Signed consent scanned into media.

## 2025-01-16 RX ORDER — BUDESONIDE 3 MG/1
9 CAPSULE, COATED PELLETS ORAL DAILY
Qty: 90 CAPSULE | Refills: 1 | Status: SHIPPED | OUTPATIENT
Start: 2025-01-16

## 2025-01-19 DIAGNOSIS — K22.10 EROSIVE ESOPHAGITIS: ICD-10-CM

## 2025-01-19 DIAGNOSIS — K44.9 HIATAL HERNIA: ICD-10-CM

## 2025-01-19 DIAGNOSIS — R13.10 ODYNOPHAGIA: ICD-10-CM

## 2025-01-20 RX ORDER — OMEPRAZOLE 40 MG/1
40 CAPSULE, DELAYED RELEASE ORAL
Qty: 90 CAPSULE | Refills: 3 | Status: SHIPPED | OUTPATIENT
Start: 2025-01-20 | End: 2026-01-20

## 2025-01-25 DIAGNOSIS — F90.2 ATTENTION DEFICIT HYPERACTIVITY DISORDER (ADHD), COMBINED TYPE: ICD-10-CM

## 2025-01-25 RX ORDER — GUANFACINE 1 MG/1
1 TABLET ORAL NIGHTLY
Qty: 30 TABLET | Refills: 0 | Status: CANCELLED | OUTPATIENT
Start: 2025-01-25

## 2025-01-26 NOTE — TELEPHONE ENCOUNTER
No care due was identified.  [facility] Embedded Care Due Messages. Reference number: [id number].   1/25/2025 8:40:26 PM CST

## 2025-01-27 RX ORDER — GUANFACINE 1 MG/1
1 TABLET, EXTENDED RELEASE ORAL NIGHTLY
Qty: 30 TABLET | Refills: 0 | Status: SHIPPED | OUTPATIENT
Start: 2025-01-27 | End: 2025-02-26

## 2025-01-27 NOTE — TELEPHONE ENCOUNTER
"Please be advised of patient's message:  "Hi, i think this one has been working well for me. Id be keen to take the xr now, if thats something youre okay with. A refill otherwise would be nice, thanks!"    LOV 11/27/2024  Last refill 12/30/2024  "

## 2025-02-11 ENCOUNTER — OFFICE VISIT (OUTPATIENT)
Dept: FAMILY MEDICINE | Facility: CLINIC | Age: 30
End: 2025-02-11
Payer: MEDICAID

## 2025-02-11 VITALS
OXYGEN SATURATION: 98 % | BODY MASS INDEX: 16.58 KG/M2 | SYSTOLIC BLOOD PRESSURE: 119 MMHG | TEMPERATURE: 98 F | HEART RATE: 71 BPM | HEIGHT: 68 IN | RESPIRATION RATE: 18 BRPM | WEIGHT: 109.38 LBS | DIASTOLIC BLOOD PRESSURE: 75 MMHG

## 2025-02-11 DIAGNOSIS — Z00.00 ANNUAL PHYSICAL EXAM: Primary | ICD-10-CM

## 2025-02-11 DIAGNOSIS — R17 ELEVATED BILIRUBIN: ICD-10-CM

## 2025-02-11 DIAGNOSIS — Z23 NEED FOR PNEUMOCOCCAL VACCINE: ICD-10-CM

## 2025-02-11 DIAGNOSIS — F90.2 ATTENTION DEFICIT HYPERACTIVITY DISORDER (ADHD), COMBINED TYPE: ICD-10-CM

## 2025-02-11 DIAGNOSIS — K50.012 CROHN'S DISEASE OF SMALL INTESTINE WITH INTESTINAL OBSTRUCTION: Chronic | ICD-10-CM

## 2025-02-11 DIAGNOSIS — E55.9 VITAMIN D DEFICIENCY: ICD-10-CM

## 2025-02-11 DIAGNOSIS — K55.1 SMAS (SUPERIOR MESENTERIC ARTERY SYNDROME): ICD-10-CM

## 2025-02-11 DIAGNOSIS — Z23 INFLUENZA VACCINE NEEDED: ICD-10-CM

## 2025-02-11 PROBLEM — R63.4 WEIGHT LOSS: Status: RESOLVED | Noted: 2024-02-22 | Resolved: 2025-02-11

## 2025-02-11 PROCEDURE — 90472 IMMUNIZATION ADMIN EACH ADD: CPT | Mod: PBBFAC,PO

## 2025-02-11 PROCEDURE — 1159F MED LIST DOCD IN RCRD: CPT | Mod: CPTII,,, | Performed by: INTERNAL MEDICINE

## 2025-02-11 PROCEDURE — 90471 IMMUNIZATION ADMIN: CPT | Mod: PBBFAC,PO

## 2025-02-11 PROCEDURE — 3008F BODY MASS INDEX DOCD: CPT | Mod: CPTII,,, | Performed by: INTERNAL MEDICINE

## 2025-02-11 PROCEDURE — 99999 PR PBB SHADOW E&M-EST. PATIENT-LVL III: CPT | Mod: PBBFAC,,, | Performed by: INTERNAL MEDICINE

## 2025-02-11 PROCEDURE — 90677 PCV20 VACCINE IM: CPT | Mod: PBBFAC,PO

## 2025-02-11 PROCEDURE — 3074F SYST BP LT 130 MM HG: CPT | Mod: CPTII,,, | Performed by: INTERNAL MEDICINE

## 2025-02-11 PROCEDURE — 3078F DIAST BP <80 MM HG: CPT | Mod: CPTII,,, | Performed by: INTERNAL MEDICINE

## 2025-02-11 PROCEDURE — 90656 IIV3 VACC NO PRSV 0.5 ML IM: CPT | Mod: PBBFAC,PO

## 2025-02-11 PROCEDURE — 99213 OFFICE O/P EST LOW 20 MIN: CPT | Mod: PBBFAC,PO | Performed by: INTERNAL MEDICINE

## 2025-02-11 PROCEDURE — 99999PBSHW PR PBB SHADOW TECHNICAL ONLY FILED TO HB: Mod: PBBFAC,,,

## 2025-02-11 PROCEDURE — 1160F RVW MEDS BY RX/DR IN RCRD: CPT | Mod: CPTII,,, | Performed by: INTERNAL MEDICINE

## 2025-02-11 PROCEDURE — 99395 PREV VISIT EST AGE 18-39: CPT | Mod: S$PBB,,, | Performed by: INTERNAL MEDICINE

## 2025-02-11 RX ORDER — GUANFACINE 1 MG/1
1 TABLET, EXTENDED RELEASE ORAL NIGHTLY
Qty: 90 TABLET | Refills: 3 | Status: SHIPPED | OUTPATIENT
Start: 2025-02-11 | End: 2026-02-11

## 2025-02-11 RX ADMIN — PNEUMOCOCCAL 20-VALENT CONJUGATE VACCINE 0.5 ML
2.2; 2.2; 2.2; 2.2; 2.2; 2.2; 2.2; 2.2; 2.2; 2.2; 2.2; 2.2; 2.2; 2.2; 2.2; 2.2; 4.4; 2.2; 2.2; 2.2 INJECTION, SUSPENSION INTRAMUSCULAR at 03:02

## 2025-02-11 RX ADMIN — INFLUENZA VIRUS VACCINE 0.5 ML: 15; 15; 15 SUSPENSION INTRAMUSCULAR at 03:02

## 2025-02-11 NOTE — ASSESSMENT & PLAN NOTE
- Assessed effectiveness of current ADHD medication regimen.  - Continued Adderall at current dose, with next refill available at March appointment.  - Reduced guanfacine dose from 3 mg to 1 mg daily.  - Instructed the patient to monitor and report any changes in ADHD symptoms or medication effectiveness.

## 2025-02-11 NOTE — PROGRESS NOTES
Health Maintenance Due   Topic     Pneumococcal Vaccines (Age 0-49) (1 of 2 - PCV) Patient agree    COVID-19 Vaccine (5 - 2024-25 season) Patient agree

## 2025-02-11 NOTE — ASSESSMENT & PLAN NOTE
- Reviewed enteroscopy and colonoscopy results, which revealed superficial ulcers in both procedures.  - Evaluated ongoing GI symptoms and recent Crohn's diagnosis.  - Noted patient's reports of persistent diarrhea and pale yellow stools.  - Reviewed enteroscopy and colonoscopy results, which showed superficial ulcers, but biopsy indicated nonspecific inflammation.  - Acknowledged the committee's conclusion of likely Crohn's disease.  - GI initiated process for Stelara approval (injections)  - cont oral budesonide per GI

## 2025-02-11 NOTE — ASSESSMENT & PLAN NOTE
- Considered need for earlier MRCP due to persistent right upper quadrant pain and potential bile duct dilation.  - Instructed the patient to report any changes in pain intensity or frequency.  - Addressed concerns about dilated bile ducts and potential causes.  - Reviewed previous MRCP results showing dilated bile ducts, initially thought to be choledochal cysts.  - Recommend further investigation of bile duct dilation with a follow-up MRCP.  - Instructed the patient to report any new or worsening symptoms related to bile duct issues.

## 2025-02-11 NOTE — ASSESSMENT & PLAN NOTE
- Noted improvement in appetite with steroid treatment, but persistent symptoms suggest possible SMA progression.  - Evaluated vitamin D supplementation and bone health concerns related to SMA.  - Discussed the importance of vitamin D supplementation and bone health in SMA patients.  - Explained the potential need for early DEXA scan to monitor bone density.  - Planned to write a letter of support for step two accommodations and removal course application, mentioning SMA diagnosis and symptoms.  - Instructed the patient to report any changes in symptoms, particularly increased incontinence, nocturnal pain, or restroom needs.  - cont f/up with GI  - has MRCP scheduled for work up of biliary duct dilation 2/2 SMA?

## 2025-02-11 NOTE — ASSESSMENT & PLAN NOTE
- Discussed the importance of vitamin D supplementation and bone health in SMA patients.  - Explained the potential need for early DEXA scan to monitor bone density.  - Continued weekly high-dose vitamin D 50,000 IU as prescribed by gastroenterologist.

## 2025-02-11 NOTE — ASSESSMENT & PLAN NOTE
Annual labs reviewed  Agreed to Prevnar and flu vaccine today   Reminded patient on yearly dental and eye exam   Physical exam completed

## 2025-02-11 NOTE — PROGRESS NOTES
Chief Complaint: Annual Exam (Patient here for early annual physical and has paperwork )      Joseph Torrez  is a 29 y.o. year old patient who presents today for     History of Present Illness    CHIEF COMPLAINT:  Joseph presents today for follow-up of multiple medical issues including SMA and suspected Crohn's disease.    GASTROINTESTINAL SYMPTOMS:  He reports improved appetite due to steroid treatment, with caloric intake increasing from 0137-5051 to approximately 2000 calories daily through frequent snacking. However, he experiences difficulty eating due to sensation of fluid rising in his throat. He reports incontinence and diarrhea. He experiences right upper quadrant pain described as constant and gnawing in nature.    SLEEP:  He reports waking up randomly during the night due to either bathroom needs or acute stabbing pain that quickly subsides.    DIAGNOSTIC STUDIES:  Enteroscopy was attempted but unsuccessful due to technical difficulties. Colonoscopy and endoscopy revealed ulcers with biopsy showing nonspecific inflammation. MRCP showed bile duct dilation, initially interpreted as a colloidal cyst, though subsequent surgical evaluation suggested uniform dilation possibly due to retrograde pressure.    CURRENT MEDICATIONS:  He has been on Budesonide for approximately one month. He reports difficulty tolerating initial guanfacine dosage due to being too high. He takes prescription-strength vitamin D weekly after lab work confirmed vitamin D deficiency.      ROS:  General: -fever, -chills, -fatigue, -weight gain, -weight loss  Eyes: -vision changes, -redness, -discharge  ENT: -ear pain, -nasal congestion, -sore throat, +difficulty swallowing  Cardiovascular: -chest pain, -palpitations, -lower extremity edema  Respiratory: -cough, -shortness of breath  Gastrointestinal: +abdominal pain, -nausea, -vomiting, +diarrhea, -constipation, -blood in stool  Genitourinary: -dysuria, -hematuria, -frequency,  +nocturia  Musculoskeletal: -joint pain, -muscle pain  Skin: -rash, -lesion  Neurological: -headache, -dizziness, -numbness, -tingling  Psychiatric: -anxiety, -depression, -sleep difficulty         Past Medical History:   Diagnosis Date    Panic disorder 02/01/2024       Past Surgical History:   Procedure Laterality Date    COLONOSCOPY      COLONOSCOPY N/A 6/20/2024    Procedure: COLONOSCOPY;  Surgeon: Lucho Luo MD;  Location: [facility] ENDO (4TH FLR);  Service: Endoscopy;  Laterality: N/A;  6/14/2024 ref Essence Gaitan NP, urgent double, PEG instr via portal- [facility]  6/15-pre call complete-tb  6/19-LVM for pt to come [facility] [place]    COLONOSCOPY N/A 9/26/2024    Procedure: COLONOSCOPY;  Surgeon: Lucho Luo MD;  Location: [facility] ENDO (4TH FLR);  Service: Endoscopy;  Laterality: N/A;    ENTEROSCOPY, DOUBLE BALLOON, ANTEGRADE N/A 12/2/2024    Procedure: ENTEROSCOPY, DOUBLE BALLOON, ANTEGRADE;  Surgeon: Timbo Smith MD;  Location: [facility] ENDO (2ND FLR);  Service: Endoscopy;  Laterality: N/A;  10/26 portal-earlier procedure date offered and declined-tt  11/21-pre call complete-tb    ESOPHAGOGASTRODUODENOSCOPY N/A 6/20/2024    Procedure: EGD (ESOPHAGOGASTRODUODENOSCOPY);  Surgeon: Lucho Luo MD;  Location: [facility] ENDO (4TH FLR);  Service: Endoscopy;  Laterality: N/A;    ESOPHAGOGASTRODUODENOSCOPY N/A 9/26/2024    Procedure: EGD (ESOPHAGOGASTRODUODENOSCOPY);  Surgeon: Lucho Luo MD;  Location: [facility] ENDO (4TH FLR);  Service: Endoscopy;  Laterality: N/A;  referred by marely Gaitan. follow up egd colon 3 months, extended miralax prep, instructions mailed to pt portal.  9/17-pt r/s, updated instructions sent to [place] [place]  9/20-lv for pre [id number] prep previous colon        Family History   Problem Relation Name Age of Onset    Cancer Mother Gaby Clay     Stroke Paternal Grandfather Abi Rivas     Learning disabilities Brother Serge Samayoa         Social History     Socioeconomic  History    Marital status: Single   Tobacco Use    Smoking status: Never     Passive exposure: Never    Smokeless tobacco: Never   Substance and Sexual Activity    Alcohol use: Not Currently    Drug use: Never    Sexual activity: Not Currently     Partners: Female     Birth control/protection: Condom     Social Drivers of Health     Financial Resource Strain: Low Risk  (2/11/2025)    Overall Financial Resource Strain (CARDIA)     Difficulty of Paying Living Expenses: Not very hard   Food Insecurity: Food Insecurity Present (2/11/2025)    Hunger Vital Sign     Worried About Running Out of Food in the Last Year: Never true     Ran Out of Food in the Last Year: Sometimes true   Transportation Needs: No Transportation Needs (1/31/2024)    PRAPARE - Transportation     Lack of Transportation (Medical): No     Lack of Transportation (Non-Medical): No   Physical Activity: Insufficiently Active (2/11/2025)    Exercise Vital Sign     Days of Exercise per Week: 3 days     Minutes of Exercise per Session: 30 min   Stress: No Stress Concern Present (2/11/2025)    [employer] - Occupational Stress Questionnaire     Feeling of Stress : Only a little   Housing Stability: Unknown (2/11/2025)    Housing Stability Vital Sign     Unable to Pay for Housing in the Last Year: No         Current Outpatient Medications:     budesonide (ENTOCORT EC) 3 mg capsule, Take 3 capsules (9 mg total) by mouth once daily., Disp: 90 capsule, Rfl: 1    dextroamphetamine-amphetamine (ADDERALL XR) 15 MG 24 hr capsule, Take 1 capsule (15 mg total) by mouth every morning., Disp: 30 capsule, Rfl: 0    [START ON 2/27/2025] dextroamphetamine-amphetamine (ADDERALL XR) 15 MG 24 hr capsule, Take 1 capsule (15 mg total) by mouth every morning., Disp: 30 capsule, Rfl: 0    ergocalciferol (ERGOCALCIFEROL) 50,000 unit Cap, Take 1 capsule (50,000 Units total) by mouth every 7 days for 60 days, THEN 1 capsule (50,000 Units total) every 30  days., Disp: 18 capsule, Rfl: 0    ferrous sulfate 325 (65 FE) MG EC tablet, Take 325 mg by mouth once daily., Disp: , Rfl:     guanFACINE 1 mg Tb24, Take 1 tablet (1 mg total) by mouth every evening., Disp: 90 tablet, Rfl: 3    hydrocortisone 2.5 % cream, Apply topically 2 (two) times daily., Disp: 28 g, Rfl: 0    multivitamin (THERAGRAN) per tablet, Take 1 tablet by mouth once daily., Disp: , Rfl:     omega-3 fatty acids/fish oil (FISH OIL-OMEGA-3 FATTY ACIDS) 300-1,000 mg capsule, Take 1 capsule by mouth once daily., Disp: , Rfl:     omeprazole (PRILOSEC) 40 MG capsule, Take 1 capsule (40 mg total) by mouth before breakfast. Best taken 45-60 minutes before your first protein meal of the day- Breakfast., Disp: 90 capsule, Rfl: 3    ustekinumab (STELARA) 90 mg/mL Syrg syringe, Inject 1 mL (90 mg total) into the skin every 8 weeks., Disp: 1 mL, Rfl: 2  No current facility-administered medications for this visit.           Objective:      Vitals:    02/11/25 1525   BP: 119/75   Pulse:    Resp:    Temp:        Physical Exam  Vitals and nursing note reviewed.   Constitutional:       Appearance: Normal appearance.   HENT:      Head: Normocephalic and atraumatic.   Cardiovascular:      Rate and Rhythm: Normal rate and regular rhythm.   Pulmonary:      Effort: Pulmonary effort is normal.      Breath sounds: Normal breath sounds. No wheezing or rales.   Abdominal:      General: Bowel sounds are normal.      Palpations: Abdomen is soft.      Tenderness: There is no abdominal tenderness.   Musculoskeletal:      Right lower leg: No edema.      Left lower leg: No edema.   Skin:     General: Skin is warm and dry.   Neurological:      General: No focal deficit present.      Mental Status: He is alert and oriented to person, place, and time.   Psychiatric:         Mood and Affect: Mood normal.         Behavior: Behavior normal.          Assessment:       1. Annual physical exam    2. Attention deficit hyperactivity disorder  (ADHD), combined type    3. Influenza vaccine needed    4. Need for pneumococcal vaccine    5. SMAS (superior mesenteric artery syndrome)    6. Vitamin D deficiency    7. Crohn's disease of small intestine with intestinal obstruction    8. Elevated bilirubin    9. Body mass index (BMI) less than 19 in adult          Plan:   1. Annual physical exam  Assessment & Plan:  Annual labs reviewed  Agreed to Prevnar and flu vaccine today   Reminded patient on yearly dental and eye exam   Physical exam completed      2. Attention deficit hyperactivity disorder (ADHD), combined type  Assessment & Plan:  - Assessed effectiveness of current ADHD medication regimen.  - Continued Adderall at current dose, with next refill available at March appointment.  - Reduced guanfacine dose from 3 mg to 1 mg daily.  - Instructed the patient to monitor and report any changes in ADHD symptoms or medication effectiveness.    Orders:  -     guanFACINE 1 mg Tb24; Take 1 tablet (1 mg total) by mouth every evening.  Dispense: 90 tablet; Refill: 3    3. Influenza vaccine needed  -     influenza (Flulaval, Fluzone, Fluarix) 45 mcg/0.5 mL IM vaccine (> or = 6 mo) 0.5 mL    4. Need for pneumococcal vaccine  -     pneumoc 20-daniel conj-dip cr(PF) (PREVNAR-20 (PF)) injection Syrg 0.5 mL    5. SMAS (superior mesenteric artery syndrome)  Assessment & Plan:  - Noted improvement in appetite with steroid treatment, but persistent symptoms suggest possible SMA progression.  - Evaluated vitamin D supplementation and bone health concerns related to SMA.  - Discussed the importance of vitamin D supplementation and bone health in SMA patients.  - Explained the potential need for early DEXA scan to monitor bone density.  - Planned to write a letter of support for step two accommodations and removal course application, mentioning SMA diagnosis and symptoms.  - Instructed the patient to report any changes in symptoms, particularly increased incontinence, nocturnal  pain, or restroom needs.  - cont f/up with GI  - has MRCP scheduled for work up of biliary duct dilation 2/2 SMA?      6. Vitamin D deficiency  Assessment & Plan:  - Discussed the importance of vitamin D supplementation and bone health in SMA patients.  - Explained the potential need for early DEXA scan to monitor bone density.  - Continued weekly high-dose vitamin D 50,000 IU as prescribed by gastroenterologist.      7. Crohn's disease of small intestine with intestinal obstruction  Assessment & Plan:  - Reviewed enteroscopy and colonoscopy results, which revealed superficial ulcers in both procedures.  - Evaluated ongoing GI symptoms and recent Crohn's diagnosis.  - Noted patient's reports of persistent diarrhea and pale yellow stools.  - Reviewed enteroscopy and colonoscopy results, which showed superficial ulcers, but biopsy indicated nonspecific inflammation.  - Acknowledged the committee's conclusion of likely Crohn's disease.  - GI initiated process for Stelara approval (injections)  - cont oral budesonide per GI      8. Elevated bilirubin  Assessment & Plan:  - Considered need for earlier MRCP due to persistent right upper quadrant pain and potential bile duct dilation.  - Instructed the patient to report any changes in pain intensity or frequency.  - Addressed concerns about dilated bile ducts and potential causes.  - Reviewed previous MRCP results showing dilated bile ducts, initially thought to be choledochal cysts.  - Recommend further investigation of bile duct dilation with a follow-up MRCP.  - Instructed the patient to report any new or worsening symptoms related to bile duct issues.      9. Body mass index (BMI) less than 19 in adult  Assessment & Plan:  - Joseph to continue tracking calorie intake to ensure adequate nutrition.      LABS:  - Reviewed recent lab results, including CMP, CBC, and CRP.    FOLLOW UP:  - Recommend scheduling appointments with eye doctor and dentist for   routine  check-ups.  - Blood pressure measurement using pediatric cuff for accuracy ordered.  - Follow up at the end of March for medication review and to address remaining paperwork.  - Contact the office via patient portal for letter of support for Step 2 exam accommodations.  - Contact the office via patient portal for letter for course removal application detailing medical timeline from May to July 2024.         Follow up for follow up already scheduled, vaccines .    This note was generated with the assistance of ambient listening technology. Verbal consent was obtained by the patient and accompanying visitor(s) for the recording of patient appointment to facilitate this note. I attest to having reviewed and edited the generated note for accuracy, though some syntax or spelling errors may persist. Please contact the author of this note for any clarification.

## 2025-02-18 NOTE — TELEPHONE ENCOUNTER
Received a medication request for patients pharmacy for his-  Budesonide (Entocort EC) 3mg/25HR CAP

## 2025-02-19 RX ORDER — BUDESONIDE 3 MG/1
9 CAPSULE, COATED PELLETS ORAL DAILY
Qty: 90 CAPSULE | Refills: 1 | Status: SHIPPED | OUTPATIENT
Start: 2025-02-19

## 2025-02-27 ENCOUNTER — TELEPHONE (OUTPATIENT)
Dept: GASTROENTEROLOGY | Facility: CLINIC | Age: 30
End: 2025-02-27
Payer: MEDICAID

## 2025-02-27 NOTE — TELEPHONE ENCOUNTER
----- Message from Joseline Denson sent at 2/27/2025 11:05 AM CST -----  Regarding: [place] APPROVED  Hello,The prior authorization for the attached patient's (OMEPRAZOLE 40MG) has been APPROVED until (2/26/2026) Ad Calderon CPPrior AuthorizationMedication Access Specialist